# Patient Record
Sex: FEMALE | Race: BLACK OR AFRICAN AMERICAN | NOT HISPANIC OR LATINO | URBAN - METROPOLITAN AREA
[De-identification: names, ages, dates, MRNs, and addresses within clinical notes are randomized per-mention and may not be internally consistent; named-entity substitution may affect disease eponyms.]

---

## 2017-09-19 ENCOUNTER — INPATIENT (INPATIENT)
Facility: HOSPITAL | Age: 52
LOS: 2 days | Discharge: HOME | DRG: 390 | End: 2017-09-22
Attending: SURGERY | Admitting: SURGERY
Payer: COMMERCIAL

## 2017-09-19 VITALS
RESPIRATION RATE: 18 BRPM | OXYGEN SATURATION: 99 % | SYSTOLIC BLOOD PRESSURE: 152 MMHG | TEMPERATURE: 98 F | HEART RATE: 99 BPM | WEIGHT: 259.93 LBS | DIASTOLIC BLOOD PRESSURE: 82 MMHG

## 2017-09-19 DIAGNOSIS — Z90.711 ACQUIRED ABSENCE OF UTERUS WITH REMAINING CERVICAL STUMP: Chronic | ICD-10-CM

## 2017-09-19 DIAGNOSIS — Z90.49 ACQUIRED ABSENCE OF OTHER SPECIFIED PARTS OF DIGESTIVE TRACT: Chronic | ICD-10-CM

## 2017-09-19 LAB
ALBUMIN SERPL ELPH-MCNC: 4.8 G/DL — SIGNIFICANT CHANGE UP (ref 3.3–5)
ALP SERPL-CCNC: 105 U/L — SIGNIFICANT CHANGE UP (ref 40–120)
ALT FLD-CCNC: 15 U/L — SIGNIFICANT CHANGE UP (ref 10–45)
AMYLASE P1 CFR SERPL: 52 U/L — SIGNIFICANT CHANGE UP (ref 25–125)
ANION GAP SERPL CALC-SCNC: 14 MMOL/L — SIGNIFICANT CHANGE UP (ref 5–17)
APPEARANCE UR: CLEAR — SIGNIFICANT CHANGE UP
APTT BLD: 37.5 SEC — HIGH (ref 27.5–37.4)
AST SERPL-CCNC: 15 U/L — SIGNIFICANT CHANGE UP (ref 10–40)
BASOPHILS NFR BLD AUTO: 0.2 % — SIGNIFICANT CHANGE UP (ref 0–2)
BILIRUB SERPL-MCNC: 0.3 MG/DL — SIGNIFICANT CHANGE UP (ref 0.2–1.2)
BILIRUB UR-MCNC: NEGATIVE — SIGNIFICANT CHANGE UP
BUN SERPL-MCNC: 19 MG/DL — SIGNIFICANT CHANGE UP (ref 7–23)
CALCIUM SERPL-MCNC: 10.3 MG/DL — SIGNIFICANT CHANGE UP (ref 8.4–10.5)
CHLORIDE SERPL-SCNC: 96 MMOL/L — SIGNIFICANT CHANGE UP (ref 96–108)
CO2 SERPL-SCNC: 25 MMOL/L — SIGNIFICANT CHANGE UP (ref 22–31)
COLOR SPEC: YELLOW — SIGNIFICANT CHANGE UP
CREAT SERPL-MCNC: 1.31 MG/DL — HIGH (ref 0.5–1.3)
DIFF PNL FLD: NEGATIVE — SIGNIFICANT CHANGE UP
EOSINOPHIL NFR BLD AUTO: 0.2 % — SIGNIFICANT CHANGE UP (ref 0–6)
EXTRA SST TUBE: SIGNIFICANT CHANGE UP
GLUCOSE SERPL-MCNC: 198 MG/DL — HIGH (ref 70–99)
GLUCOSE UR QL: NEGATIVE — SIGNIFICANT CHANGE UP
HCT VFR BLD CALC: 47 % — HIGH (ref 34.5–45)
HGB BLD-MCNC: 15.4 G/DL — SIGNIFICANT CHANGE UP (ref 11.5–15.5)
INR BLD: 1.04 — SIGNIFICANT CHANGE UP (ref 0.88–1.16)
KETONES UR-MCNC: NEGATIVE — SIGNIFICANT CHANGE UP
LEUKOCYTE ESTERASE UR-ACNC: NEGATIVE — SIGNIFICANT CHANGE UP
LIDOCAIN IGE QN: 14 U/L — SIGNIFICANT CHANGE UP (ref 7–60)
LYMPHOCYTES # BLD AUTO: 13 % — SIGNIFICANT CHANGE UP (ref 13–44)
MCHC RBC-ENTMCNC: 29.2 PG — SIGNIFICANT CHANGE UP (ref 27–34)
MCHC RBC-ENTMCNC: 32.8 G/DL — SIGNIFICANT CHANGE UP (ref 32–36)
MCV RBC AUTO: 89.2 FL — SIGNIFICANT CHANGE UP (ref 80–100)
MONOCYTES NFR BLD AUTO: 2.4 % — SIGNIFICANT CHANGE UP (ref 2–14)
NEUTROPHILS NFR BLD AUTO: 84.2 % — HIGH (ref 43–77)
NITRITE UR-MCNC: NEGATIVE — SIGNIFICANT CHANGE UP
PH UR: 6 — SIGNIFICANT CHANGE UP (ref 5–8)
PLATELET # BLD AUTO: 316 K/UL — SIGNIFICANT CHANGE UP (ref 150–400)
POTASSIUM SERPL-MCNC: 4.6 MMOL/L — SIGNIFICANT CHANGE UP (ref 3.5–5.3)
POTASSIUM SERPL-SCNC: 4.6 MMOL/L — SIGNIFICANT CHANGE UP (ref 3.5–5.3)
PROT SERPL-MCNC: 9.2 G/DL — HIGH (ref 6–8.3)
PROT UR-MCNC: NEGATIVE MG/DL — SIGNIFICANT CHANGE UP
PROTHROM AB SERPL-ACNC: 11.6 SEC — SIGNIFICANT CHANGE UP (ref 9.8–12.7)
RBC # BLD: 5.27 M/UL — HIGH (ref 3.8–5.2)
RBC # FLD: 12.4 % — SIGNIFICANT CHANGE UP (ref 10.3–16.9)
SODIUM SERPL-SCNC: 135 MMOL/L — SIGNIFICANT CHANGE UP (ref 135–145)
SP GR SPEC: 1.01 — SIGNIFICANT CHANGE UP (ref 1–1.03)
UROBILINOGEN FLD QL: 0.2 E.U./DL — SIGNIFICANT CHANGE UP
WBC # BLD: 10.6 K/UL — HIGH (ref 3.8–10.5)
WBC # FLD AUTO: 10.6 K/UL — HIGH (ref 3.8–10.5)

## 2017-09-19 PROCEDURE — 99285 EMERGENCY DEPT VISIT HI MDM: CPT | Mod: 25

## 2017-09-19 PROCEDURE — 71010: CPT | Mod: 26

## 2017-09-19 PROCEDURE — 74177 CT ABD & PELVIS W/CONTRAST: CPT | Mod: 26

## 2017-09-19 RX ORDER — SODIUM CHLORIDE 9 MG/ML
1000 INJECTION, SOLUTION INTRAVENOUS
Qty: 0 | Refills: 0 | Status: DISCONTINUED | OUTPATIENT
Start: 2017-09-19 | End: 2017-09-22

## 2017-09-19 RX ORDER — METOCLOPRAMIDE HCL 10 MG
10 TABLET ORAL ONCE
Qty: 0 | Refills: 0 | Status: COMPLETED | OUTPATIENT
Start: 2017-09-19 | End: 2017-09-19

## 2017-09-19 RX ORDER — SODIUM CHLORIDE 9 MG/ML
1000 INJECTION INTRAMUSCULAR; INTRAVENOUS; SUBCUTANEOUS ONCE
Qty: 0 | Refills: 0 | Status: COMPLETED | OUTPATIENT
Start: 2017-09-19 | End: 2017-09-19

## 2017-09-19 RX ORDER — FAMOTIDINE 10 MG/ML
20 INJECTION INTRAVENOUS ONCE
Qty: 0 | Refills: 0 | Status: COMPLETED | OUTPATIENT
Start: 2017-09-19 | End: 2017-09-19

## 2017-09-19 RX ORDER — INFLUENZA VIRUS VACCINE 15; 15; 15; 15 UG/.5ML; UG/.5ML; UG/.5ML; UG/.5ML
0.5 SUSPENSION INTRAMUSCULAR ONCE
Qty: 0 | Refills: 0 | Status: COMPLETED | OUTPATIENT
Start: 2017-09-19 | End: 2017-09-19

## 2017-09-19 RX ORDER — ONDANSETRON 8 MG/1
4 TABLET, FILM COATED ORAL EVERY 6 HOURS
Qty: 0 | Refills: 0 | Status: DISCONTINUED | OUTPATIENT
Start: 2017-09-19 | End: 2017-09-22

## 2017-09-19 RX ORDER — ACETAMINOPHEN 500 MG
1000 TABLET ORAL ONCE
Qty: 0 | Refills: 0 | Status: COMPLETED | OUTPATIENT
Start: 2017-09-19 | End: 2017-09-19

## 2017-09-19 RX ORDER — DIPHENHYDRAMINE HCL 50 MG
25 CAPSULE ORAL ONCE
Qty: 0 | Refills: 0 | Status: COMPLETED | OUTPATIENT
Start: 2017-09-19 | End: 2017-09-19

## 2017-09-19 RX ORDER — ONDANSETRON 8 MG/1
4 TABLET, FILM COATED ORAL ONCE
Qty: 0 | Refills: 0 | Status: COMPLETED | OUTPATIENT
Start: 2017-09-19 | End: 2017-09-19

## 2017-09-19 RX ORDER — BENZOCAINE AND MENTHOL 5; 1 G/100ML; G/100ML
1 LIQUID ORAL THREE TIMES A DAY
Qty: 0 | Refills: 0 | Status: DISCONTINUED | OUTPATIENT
Start: 2017-09-19 | End: 2017-09-22

## 2017-09-19 RX ORDER — MORPHINE SULFATE 50 MG/1
4 CAPSULE, EXTENDED RELEASE ORAL ONCE
Qty: 0 | Refills: 0 | Status: DISCONTINUED | OUTPATIENT
Start: 2017-09-19 | End: 2017-09-19

## 2017-09-19 RX ORDER — IOHEXOL 300 MG/ML
50 INJECTION, SOLUTION INTRAVENOUS ONCE
Qty: 0 | Refills: 0 | Status: COMPLETED | OUTPATIENT
Start: 2017-09-19 | End: 2017-09-19

## 2017-09-19 RX ORDER — HEPARIN SODIUM 5000 [USP'U]/ML
7500 INJECTION INTRAVENOUS; SUBCUTANEOUS EVERY 8 HOURS
Qty: 0 | Refills: 0 | Status: DISCONTINUED | OUTPATIENT
Start: 2017-09-19 | End: 2017-09-22

## 2017-09-19 RX ADMIN — Medication 1000 MILLIGRAM(S): at 22:00

## 2017-09-19 RX ADMIN — SODIUM CHLORIDE 180 MILLILITER(S): 9 INJECTION, SOLUTION INTRAVENOUS at 09:22

## 2017-09-19 RX ADMIN — IOHEXOL 50 MILLILITER(S): 300 INJECTION, SOLUTION INTRAVENOUS at 03:08

## 2017-09-19 RX ADMIN — ONDANSETRON 4 MILLIGRAM(S): 8 TABLET, FILM COATED ORAL at 03:08

## 2017-09-19 RX ADMIN — Medication 10 MILLIGRAM(S): at 04:38

## 2017-09-19 RX ADMIN — HEPARIN SODIUM 7500 UNIT(S): 5000 INJECTION INTRAVENOUS; SUBCUTANEOUS at 13:27

## 2017-09-19 RX ADMIN — SODIUM CHLORIDE 1000 MILLILITER(S): 9 INJECTION INTRAMUSCULAR; INTRAVENOUS; SUBCUTANEOUS at 06:09

## 2017-09-19 RX ADMIN — MORPHINE SULFATE 4 MILLIGRAM(S): 50 CAPSULE, EXTENDED RELEASE ORAL at 06:08

## 2017-09-19 RX ADMIN — BENZOCAINE AND MENTHOL 1 LOZENGE: 5; 1 LIQUID ORAL at 21:09

## 2017-09-19 RX ADMIN — MORPHINE SULFATE 4 MILLIGRAM(S): 50 CAPSULE, EXTENDED RELEASE ORAL at 03:08

## 2017-09-19 RX ADMIN — Medication 400 MILLIGRAM(S): at 09:22

## 2017-09-19 RX ADMIN — HEPARIN SODIUM 7500 UNIT(S): 5000 INJECTION INTRAVENOUS; SUBCUTANEOUS at 21:09

## 2017-09-19 RX ADMIN — Medication 25 MILLIGRAM(S): at 22:11

## 2017-09-19 RX ADMIN — FAMOTIDINE 20 MILLIGRAM(S): 10 INJECTION INTRAVENOUS at 03:08

## 2017-09-19 RX ADMIN — Medication 400 MILLIGRAM(S): at 21:09

## 2017-09-19 RX ADMIN — Medication 1000 MILLIGRAM(S): at 09:37

## 2017-09-19 RX ADMIN — MORPHINE SULFATE 4 MILLIGRAM(S): 50 CAPSULE, EXTENDED RELEASE ORAL at 04:38

## 2017-09-19 RX ADMIN — SODIUM CHLORIDE 180 MILLILITER(S): 9 INJECTION, SOLUTION INTRAVENOUS at 22:40

## 2017-09-19 RX ADMIN — BENZOCAINE AND MENTHOL 1 LOZENGE: 5; 1 LIQUID ORAL at 13:26

## 2017-09-19 RX ADMIN — SODIUM CHLORIDE 2000 MILLILITER(S): 9 INJECTION INTRAMUSCULAR; INTRAVENOUS; SUBCUTANEOUS at 03:09

## 2017-09-19 NOTE — ED PROVIDER NOTE - PHYSICAL EXAMINATION
CONSTITUTIONAL: Well-appearing; well-nourished; in no apparent distress.   HEAD: Normocephalic; atraumatic.   EYES: PERRL; EOM intact; conjunctiva and sclera clear  ENT: normal nose; no rhinorrhea; normal pharynx with no erythema or lesions.   NECK: Supple; non-tender; no LAD  CARDIOVASCULAR: Normal S1, S2; no murmurs, rubs, or gallops. Regular rate and rhythm.   RESPIRATORY: Breathing easily; breath sounds clear and equal bilaterally; no wheezes, rhonchi, or rales.  GI: Soft; non-distended; +tenderness to RLQ   MSK: FROM at all extremities, normal tone   EXT: No cyanosis or edema; N/V intact  SKIN: Normal for age and race; warm; dry; good turgor; no apparent lesions or rash.   NEURO: A & O x 3; face symmetric; grossly unremarkable.   PSYCHOLOGICAL: The patient’s mood and manner are appropriate.

## 2017-09-19 NOTE — H&P ADULT - ASSESSMENT
Ms. Amelia Cross is a 52 yo female with morbid obesity, htn, and known diverticulosis presenting with a day hx of diffuse abdominal pain, nausea, and multiple episodes of nbnb vomiting.     HD stable, afebrile, mild leukocytosis, and CT imaging significant for  high grade small bowel obstruction in the small bowel in the ileum. Patient appears comfortable and had flatus as recently as last night.     - Admit to General Surgery under Dr. Turcios  - NPO/IVF  - No NG tube at the moment  - Serial abdominal exams   - DVT ppx

## 2017-09-19 NOTE — H&P ADULT - HISTORY OF PRESENT ILLNESS
Mrs. Amelia Dozier is a 50 yo female with a hx significant for hypertension, morbid obesity, and known diverticulitis (diagnosed 3 years ago). Surgical hx significant for partial hysterectomy (c/b by SBO and questionable volvulus (per patient report) for which she went to [facility] and received NG decompression), questionable history of cholecystotomy (in ED note, but patient seemed confused when asked about it).     She presents to [facility] ED with a 1 day hx of diffuse nausea and abdominal pain, associated with PO intolerance, 7 episodes of NBNB vomiting, and chills. Reports diffuse pain in lower abdomen, mostly on the right side, as well as focal pain and discomfort in the epigastric region. Last bowel movement was in 24 hours ago, yesterday morning, and described as loose and watery. Reports no flatus since last night.     Reports last colonoscopy was in  which showed diverticulosis. Also had an endoscopy in 2016 which was negative.     Allergies: Percocet (pruritis)    Meds: Losartan for Hypertension        Vital Signs Last 24 Hrs  T(F): 97.7 (19 Sep 2017 07:49), Max: 98.7 (19 Sep 2017 06:57)  HR: 89 (19 Sep 2017 07:49) (79 - 99)  BP: 140/91 (19 Sep 2017 07:49) (128/89 - 152/82)  BP(mean): --  RR: 18 (19 Sep 2017 07:49) (18 - 19)  SpO2: 99% (19 Sep 2017 07:49) (99% - 99%)      General: NAD, alert, interactive, appears comfortable  Pulm: Nonlabored breathing, no respiratory distress  Abd: morbidly obese, softly distended, very mild discomfort to palpation in RLQ and epigastrium, no rebound or guarding         LABS:                        15.4   10.6  )-----------( 316      ( 19 Sep 2017 02:49 )             47.0     -    135  |  96  |  19  ----------------------------<  198<H>  4.6   |  25  |  1.31<H>    Ca    10.3      19 Sep 2017 02:49    TPro  9.2<H>  /  Alb  4.8  /  TBili  0.3  /  DBili  x   /  AST  15  /  ALT  15  /  AlkPhos  105      PT/INR - ( 19 Sep 2017 02:49 )   PT: 11.6 sec;   INR: 1.04          PTT - ( 19 Sep 2017 02:49 )  PTT:37.5 sec  Urinalysis Basic - ( 19 Sep 2017 07:20 )    Color: Yellow / Appearance: Clear / S.010 / pH: x  Gluc: x / Ketone: NEGATIVE  / Bili: NEGATIVE / Urobili: 0.2 E.U./dL   Blood: x / Protein: NEGATIVE mg/dL / Nitrite: NEGATIVE   Leuk Esterase: NEGATIVE / RBC: x / WBC x   Sq Epi: x / Non Sq Epi: x / Bacteria: x        RADIOLOGY & ADDITIONAL STUDIES:    < from: CT Abdomen and Pelvis w/ Oral Cont and w/ IV Cont (17 @ 05:12) >  Stomach and bowel: Fluid distended and several dilated loops of small   bowel measuring up to 4 cm  in diameter with transition to decompressed ileum and colon in the mid   anterior pelvis.. Semisolid  stool like material in distal dilated small bowel suggests a chronic   component to the high-grade  obstruction.  Appendix: Appendix well-visualized. No evidence of appendicitis.    IMPRESSION:  High-grade small bowel obstruction.  Stable post cholecystectomy biliary dilatation.  Thank you for allowing us to participate in the care of your patient.  Dictated and Authenticated by: Wilian Hughes MD  2017 5:34 AM Eastern Time ([place] & [place])    The above report was submitted by the [facility] attending radiologist and   copied to [facility] by resident Dr. Neely.    Resident addendum: Status post at least supracervical hysterectomy.    < end of copied text >

## 2017-09-19 NOTE — H&P ADULT - ATTENDING COMMENTS
Seen and examined this AM  Mild TTP diffusely without guarding.  CT consistent with SBO    A/P: Adhesive small bowel obstruction  1. NGT, IVF  2. OOB, IS  3. Monitor abdominal symptoms, exam.

## 2017-09-19 NOTE — ED PROVIDER NOTE - OBJECTIVE STATEMENT
52 yo F with pmh of diverticulitis, gallstones and HTN c/o generalized abd pain since 4pm. Pain more severe around umbilicus. Pain is constant associated with "contractions." Associated with chills. Reports 7 episodes of non-bloody, non-bilious vomiting. Denies fever, diarrhea, dysuria, hematuria, cp, sob. Pt states she was given a script of cipro just in case she has symptoms which she started taking today. Pt states pain is worse than her previous diverticulitis attack. Denies sick contacts, recent travel or bad food exposure. Last BM was yesterday. Pts GI doctor is Dr. Kay 50 yo F with pmh of diverticulitis, gallstones s/o cholecystectomy, s/p partial hysterectomy and HTN c/o generalized abd pain since 4pm. Pain more severe around umbilicus. Pain is constant associated with "contractions." Associated with chills. Reports 7 episodes of non-bloody, non-bilious vomiting. Denies fever, diarrhea, dysuria, hematuria, cp, sob. Pt states she was given a script of cipro just in case she has symptoms which she started taking today. Pt states pain is worse than her previous diverticulitis attack. Denies sick contacts, recent travel or bad food exposure. Last BM was yesterday. Pts GI doctor is Dr. Kay

## 2017-09-19 NOTE — PROVIDER CONTACT NOTE (OTHER) - ASSESSMENT
Pt arrived to [address] [place] [address]. There are no orders. Pt also requested for pain and antinausea medication.

## 2017-09-19 NOTE — ED PROVIDER NOTE - ATTENDING CONTRIBUTION TO CARE
50 yo F with hx of of HTN, diverticulitis, s/p cholecystectomy and partial hysterectomy presents with several ours of generalized abd pain with chills and several episodes of nbnb emesis. Denies fevers,  dysuria, hematuria, cp, sob. Pt AAO, NAD, Abd: soft/ (+) diffuse abd TTP, no rebound. Labs/ studies noted. Pt with SBO. Surgery consulted and pt admitted to surgery. Stable in ED.

## 2017-09-19 NOTE — ED PROVIDER NOTE - MEDICAL DECISION MAKING DETAILS
52 yo f with pmh of htn, gallstones, diverticulitis c/o generalized abd pain since 4pm today with n/v and chills. +ttp to RLQ, r/o appendicitis r/o diverticulitis

## 2017-09-19 NOTE — ED PROVIDER NOTE - NS ED MD DISPO ADMIT [facility]
[address] 
[address] [facility] [id number] 
[phone] Work/School Note Date: 5/9/2017 To Whom It May concern: 
 
Jj Null was seen and treated today in the emergency room by the following provider(s): 
Physician Assistant: Neisha Benedict PA-C. Jj Null may return to work on Friday, 5/12/2017 after being cleared by an orthopedist. 
 
Sincerely, Jad Olmos RN 
 
 
 

SURGERY

## 2017-09-19 NOTE — ED ADULT NURSE NOTE - OBJECTIVE STATEMENT
pt. with PMH of diverticulitis presented with c/o lower abdominal pain and lower back pain and vomiting since 4pm yesterday. pt. is A&Ox3, breathing unlabored, skin warm, dry, pt. rates pain 8/10. pt. denies any chest pain, shortness of breath, diarrhea, constipation, urinary symptoms, fever, chills.

## 2017-09-20 LAB
ANION GAP SERPL CALC-SCNC: 12 MMOL/L — SIGNIFICANT CHANGE UP (ref 5–17)
BUN SERPL-MCNC: 10 MG/DL — SIGNIFICANT CHANGE UP (ref 7–23)
CALCIUM SERPL-MCNC: 8.3 MG/DL — LOW (ref 8.4–10.5)
CHLORIDE SERPL-SCNC: 99 MMOL/L — SIGNIFICANT CHANGE UP (ref 96–108)
CO2 SERPL-SCNC: 25 MMOL/L — SIGNIFICANT CHANGE UP (ref 22–31)
CREAT SERPL-MCNC: 1.18 MG/DL — SIGNIFICANT CHANGE UP (ref 0.5–1.3)
GLUCOSE SERPL-MCNC: 125 MG/DL — HIGH (ref 70–99)
HCT VFR BLD CALC: 40 % — SIGNIFICANT CHANGE UP (ref 34.5–45)
HGB BLD-MCNC: 13 G/DL — SIGNIFICANT CHANGE UP (ref 11.5–15.5)
MAGNESIUM SERPL-MCNC: 1.9 MG/DL — SIGNIFICANT CHANGE UP (ref 1.6–2.6)
MCHC RBC-ENTMCNC: 29.6 PG — SIGNIFICANT CHANGE UP (ref 27–34)
MCHC RBC-ENTMCNC: 32.5 G/DL — SIGNIFICANT CHANGE UP (ref 32–36)
MCV RBC AUTO: 91.1 FL — SIGNIFICANT CHANGE UP (ref 80–100)
PHOSPHATE SERPL-MCNC: 2.2 MG/DL — LOW (ref 2.5–4.5)
PLATELET # BLD AUTO: 266 K/UL — SIGNIFICANT CHANGE UP (ref 150–400)
POTASSIUM SERPL-MCNC: 3.6 MMOL/L — SIGNIFICANT CHANGE UP (ref 3.5–5.3)
POTASSIUM SERPL-SCNC: 3.6 MMOL/L — SIGNIFICANT CHANGE UP (ref 3.5–5.3)
RBC # BLD: 4.39 M/UL — SIGNIFICANT CHANGE UP (ref 3.8–5.2)
RBC # FLD: 12.4 % — SIGNIFICANT CHANGE UP (ref 10.3–16.9)
SODIUM SERPL-SCNC: 136 MMOL/L — SIGNIFICANT CHANGE UP (ref 135–145)
WBC # BLD: 8.6 K/UL — SIGNIFICANT CHANGE UP (ref 3.8–10.5)
WBC # FLD AUTO: 8.6 K/UL — SIGNIFICANT CHANGE UP (ref 3.8–10.5)

## 2017-09-20 RX ORDER — ACETAMINOPHEN 500 MG
1000 TABLET ORAL ONCE
Qty: 0 | Refills: 0 | Status: COMPLETED | OUTPATIENT
Start: 2017-09-20 | End: 2017-09-20

## 2017-09-20 RX ORDER — DIPHENHYDRAMINE HCL 50 MG
50 CAPSULE ORAL AT BEDTIME
Qty: 0 | Refills: 0 | Status: DISCONTINUED | OUTPATIENT
Start: 2017-09-20 | End: 2017-09-22

## 2017-09-20 RX ORDER — MORPHINE SULFATE 50 MG/1
2 CAPSULE, EXTENDED RELEASE ORAL EVERY 4 HOURS
Qty: 0 | Refills: 0 | Status: DISCONTINUED | OUTPATIENT
Start: 2017-09-20 | End: 2017-09-22

## 2017-09-20 RX ORDER — ACETAMINOPHEN 500 MG
1000 TABLET ORAL EVERY 6 HOURS
Qty: 0 | Refills: 0 | Status: DISCONTINUED | OUTPATIENT
Start: 2017-09-20 | End: 2017-09-22

## 2017-09-20 RX ADMIN — Medication 1000 MILLIGRAM(S): at 10:40

## 2017-09-20 RX ADMIN — Medication 50 MILLIGRAM(S): at 22:10

## 2017-09-20 RX ADMIN — SODIUM CHLORIDE 180 MILLILITER(S): 9 INJECTION, SOLUTION INTRAVENOUS at 05:24

## 2017-09-20 RX ADMIN — MORPHINE SULFATE 2 MILLIGRAM(S): 50 CAPSULE, EXTENDED RELEASE ORAL at 22:30

## 2017-09-20 RX ADMIN — BENZOCAINE AND MENTHOL 1 LOZENGE: 5; 1 LIQUID ORAL at 14:24

## 2017-09-20 RX ADMIN — HEPARIN SODIUM 7500 UNIT(S): 5000 INJECTION INTRAVENOUS; SUBCUTANEOUS at 22:10

## 2017-09-20 RX ADMIN — HEPARIN SODIUM 7500 UNIT(S): 5000 INJECTION INTRAVENOUS; SUBCUTANEOUS at 05:24

## 2017-09-20 RX ADMIN — Medication 400 MILLIGRAM(S): at 10:05

## 2017-09-20 RX ADMIN — MORPHINE SULFATE 2 MILLIGRAM(S): 50 CAPSULE, EXTENDED RELEASE ORAL at 22:10

## 2017-09-20 RX ADMIN — BENZOCAINE AND MENTHOL 1 LOZENGE: 5; 1 LIQUID ORAL at 05:24

## 2017-09-20 RX ADMIN — HEPARIN SODIUM 7500 UNIT(S): 5000 INJECTION INTRAVENOUS; SUBCUTANEOUS at 14:24

## 2017-09-20 NOTE — PROGRESS NOTE ADULT - SUBJECTIVE AND OBJECTIVE BOX
o/n : NGT output :  serial abd exam : soft, non tender, patient claimed the distension was decreasing. +flatus.  9/19: Admitted for SBO; WBC: 10.6; HD; stable; distended; ng tube placed; o/n : NGT output :400cc  serial abd exam : soft, non tender, patient claimed the distension was decreasing. +flatus.  9/19: Admitted for SBO; WBC: 10.6; HD; stable; distended; ng tube placed; o/n : NGT output :400cc  serial abd exam : soft, non tender, patient claimed the distension was decreasing. +flatus.  : Admitted for SBO; WBC: 10.6; HD; stable; distended; ng tube placed;     SUBJECTIVE: Patient seen and examined bedside by surgery team. +flatus. abd pain decreasing. NGT out this am.     heparin  Injectable 7500 Unit(s) SubCutaneous every 8 hours      Vital Signs Last 24 Hrs  T(C): 37.1 (20 Sep 2017 05:23), Max: 37.3 (19 Sep 2017 16:38)  T(F): 98.7 (20 Sep 2017 05:23), Max: 99.2 (19 Sep 2017 16:38)  HR: 102 (20 Sep 2017 05:23) (76 - 102)  BP: 137/89 (20 Sep 2017 05:23) (133/88 - 147/91)  BP(mean): --  RR: 17 (20 Sep 2017 05:23) (16 - 17)  SpO2: 97% (20 Sep 2017 05:23) (97% - 98%)  I&O's Detail    19 Sep 2017 07:01  -  20 Sep 2017 07:00  --------------------------------------------------------  IN:    IV PiggyBack: 100 mL    sodium chloride 0.45%.: 1890 mL  Total IN: 1990 mL    OUT:    Nasoenteral Tube: 900 mL    Voided: 1100 mL  Total OUT: 2000 mL    Total NET: -10 mL          General: NAD, resting comfortably in bed  C/V: NSR  Pulm: Nonlabored breathing, no respiratory distress  Abd: softly distended. mild discomfort in RLQ, improving exam. No r/g  Extrem: WWP, no edema, SCDs in place        LABS:                        13.0   8.6   )-----------( 266      ( 20 Sep 2017 06:37 )             40.0     09-20    136  |  99  |  10  ----------------------------<  125<H>  3.6   |  25  |  1.18    Ca    8.3<L>      20 Sep 2017 06:39  Phos  2.2     -  Mg     1.9     -    TPro  9.2<H>  /  Alb  4.8  /  TBili  0.3  /  DBili  x   /  AST  15  /  ALT  15  /  AlkPhos  105  -    PT/INR - ( 19 Sep 2017 02:49 )   PT: 11.6 sec;   INR: 1.04          PTT - ( 19 Sep 2017 02:49 )  PTT:37.5 sec  Urinalysis Basic - ( 19 Sep 2017 07:20 )    Color: Yellow / Appearance: Clear / S.010 / pH: x  Gluc: x / Ketone: NEGATIVE  / Bili: NEGATIVE / Urobili: 0.2 E.U./dL   Blood: x / Protein: NEGATIVE mg/dL / Nitrite: NEGATIVE   Leuk Esterase: NEGATIVE / RBC: x / WBC x   Sq Epi: x / Non Sq Epi: x / Bacteria: x        RADIOLOGY & ADDITIONAL STUDIES:

## 2017-09-20 NOTE — PROGRESS NOTE ADULT - ASSESSMENT
50 yo female with morbid obesity, htn, and known diverticulosis presenting with SBO - improving.    Monitor I/O  GI fx : +flatus.   serial abdominal exams Q6  DVT prophylaxis  OOTB/ISS  Pain control - no narcotics  NPO/IVF  DC NGT?  Advance to CLD? 50 yo female with morbid obesity, htn, and known diverticulosis presenting with SBO - improving.    Monitor I/O  GI fx : +flatus.   serial abdominal exams Q6  DVT prophylaxis  OOTB/ISS  Pain control - no narcotics  NPO/IVF  DC NGT  c/w sips/chips this am

## 2017-09-21 LAB
ANION GAP SERPL CALC-SCNC: 8 MMOL/L — SIGNIFICANT CHANGE UP (ref 5–17)
BUN SERPL-MCNC: 7 MG/DL — SIGNIFICANT CHANGE UP (ref 7–23)
CALCIUM SERPL-MCNC: 8.1 MG/DL — LOW (ref 8.4–10.5)
CHLORIDE SERPL-SCNC: 107 MMOL/L — SIGNIFICANT CHANGE UP (ref 96–108)
CO2 SERPL-SCNC: 25 MMOL/L — SIGNIFICANT CHANGE UP (ref 22–31)
CREAT SERPL-MCNC: 1.1 MG/DL — SIGNIFICANT CHANGE UP (ref 0.5–1.3)
GLUCOSE SERPL-MCNC: 106 MG/DL — HIGH (ref 70–99)
MAGNESIUM SERPL-MCNC: 2.1 MG/DL — SIGNIFICANT CHANGE UP (ref 1.6–2.6)
PHOSPHATE SERPL-MCNC: 2.2 MG/DL — LOW (ref 2.5–4.5)
POTASSIUM SERPL-MCNC: 3.8 MMOL/L — SIGNIFICANT CHANGE UP (ref 3.5–5.3)
POTASSIUM SERPL-SCNC: 3.8 MMOL/L — SIGNIFICANT CHANGE UP (ref 3.5–5.3)
SODIUM SERPL-SCNC: 140 MMOL/L — SIGNIFICANT CHANGE UP (ref 135–145)

## 2017-09-21 RX ORDER — POTASSIUM PHOSPHATE, MONOBASIC POTASSIUM PHOSPHATE, DIBASIC 236; 224 MG/ML; MG/ML
15 INJECTION, SOLUTION INTRAVENOUS ONCE
Qty: 0 | Refills: 0 | Status: COMPLETED | OUTPATIENT
Start: 2017-09-21 | End: 2017-09-21

## 2017-09-21 RX ADMIN — POTASSIUM PHOSPHATE, MONOBASIC POTASSIUM PHOSPHATE, DIBASIC 62.5 MILLIMOLE(S): 236; 224 INJECTION, SOLUTION INTRAVENOUS at 22:44

## 2017-09-21 RX ADMIN — SODIUM CHLORIDE 180 MILLILITER(S): 9 INJECTION, SOLUTION INTRAVENOUS at 07:24

## 2017-09-21 RX ADMIN — HEPARIN SODIUM 7500 UNIT(S): 5000 INJECTION INTRAVENOUS; SUBCUTANEOUS at 13:48

## 2017-09-21 RX ADMIN — HEPARIN SODIUM 7500 UNIT(S): 5000 INJECTION INTRAVENOUS; SUBCUTANEOUS at 07:24

## 2017-09-21 RX ADMIN — Medication 50 MILLIGRAM(S): at 22:48

## 2017-09-21 RX ADMIN — HEPARIN SODIUM 7500 UNIT(S): 5000 INJECTION INTRAVENOUS; SUBCUTANEOUS at 22:47

## 2017-09-21 NOTE — PROGRESS NOTE ADULT - ASSESSMENT
52 yo female with morbid obesity, htn, and known diverticulosis presenting with SBO, resolving +GI fx, however unable to tolerate CLD, on NPO with sips and chips     - Advance to CLD  - Serial abd exam  - pain and nausea control  - DVT prophylaxis : SQH and SCD 50 yo female with morbid obesity, htn, and known diverticulosis presenting with SBO, resolving +GI fx, however unable to tolerate CLD, on NPO with sips and chips     - Advance to CLD, currently NPO with IVF  - Serial abd exam  - pain and nausea control  - DVT prophylaxis : SQH and SCD  - OOTB/ISS 50 yo female with morbid obesity, htn, and known diverticulosis presenting with SBO, resolving +GI fx    - Advance to Clears in AM, advance as tolerated  - Possible discharge later this evening if she does well    - Serial abd exam  - pain and nausea control  - DVT prophylaxis : SQH and SCD  - OOTB/ISS

## 2017-09-21 NOTE — PROGRESS NOTE ADULT - ATTENDING COMMENTS
Passed flatus, feels better. However, had one episode of cramping this AM since NGT removed.  AFVSS  Abd soft, mild dist, minimal TTP diffusely    A/P: Resolving small bowel obstruction due to adhesions.  1. sips of clears today  2. OOB, IS  3. Gentle IVF hydration.
Had difficulty with liquids yesterday morning, now feels better.  Passing flatus  AFVSS  Abd soft, min distention, slight tenderness in RUQ    Resolving SBO  1. clears -> low residue diet  2. OOB, IS  3. If doing well can go home.  Otherwise AXR and possible operation.

## 2017-09-21 NOTE — PROGRESS NOTE ADULT - SUBJECTIVE AND OBJECTIVE BOX
O/N : Had BM today, still on sips and chips.  9/20: advanced to clears in AM; not tolerating; increased pain in epigastrium and distended; +flatus; no BM O/N : Had BM today, still on sips and chips.  9/20: advanced to clears in AM; not tolerating; increased pain in epigastrium and distended; +flatus; no BM    SUBJECTIVE: Patient seen and examined bedside by surgery team. Patient did well overnight. Reports flatus, but no bowel movement. No nausea, but still feels a bit distended. No other complaints. Patient ready to go home.      heparin  Injectable 7500 Unit(s) SubCutaneous every 8 hours      Vital Signs Last 24 Hrs  T(C): 36.5 (21 Sep 2017 05:46), Max: 37.2 (20 Sep 2017 17:40)  T(F): 97.7 (21 Sep 2017 05:46), Max: 99 (20 Sep 2017 17:40)  HR: 90 (21 Sep 2017 05:46) (75 - 98)  BP: 96/62 (21 Sep 2017 05:46) (96/62 - 135/80)  BP(mean): --  RR: 16 (21 Sep 2017 05:46) (16 - 18)  SpO2: 93% (21 Sep 2017 05:46) (93% - 99%)  I&O's Detail    20 Sep 2017 07:01  -  21 Sep 2017 07:00  --------------------------------------------------------  IN:    sodium chloride 0.45%.: 1260 mL  Total IN: 1260 mL    OUT:    Voided: 700 mL  Total OUT: 700 mL    Total NET: 560 mL          General: NAD, alert, interactive, appears comfortable  C/V: NSR  Pulm: Nonlabored breathing, no respiratory distress  Abd: softly distended, mild tenderness to palpation   Extrem: WWP, no edema, SCDs in place        LABS:                        13.0   8.6   )-----------( 266      ( 20 Sep 2017 06:37 )             40.0     09-20    136  |  99  |  10  ----------------------------<  125<H>  3.6   |  25  |  1.18    Ca    8.3<L>      20 Sep 2017 06:39  Phos  2.2     09-20  Mg     1.9     09-20            RADIOLOGY & ADDITIONAL STUDIES: SUBJECTIVE: Patient seen and examined bedside by surgery team. Patient did well overnight. Improvement in nausea and discomfort since yesterday. Reports flatus, but no bowel movement. Reports no nausea, but still feels a bit distended. No other complaints. Patient ready to go home.      heparin  Injectable 7500 Unit(s) SubCutaneous every 8 hours      Vital Signs Last 24 Hrs  T(C): 36.5 (21 Sep 2017 05:46), Max: 37.2 (20 Sep 2017 17:40)  T(F): 97.7 (21 Sep 2017 05:46), Max: 99 (20 Sep 2017 17:40)  HR: 90 (21 Sep 2017 05:46) (75 - 98)  BP: 96/62 (21 Sep 2017 05:46) (96/62 - 135/80)  BP(mean): --  RR: 16 (21 Sep 2017 05:46) (16 - 18)  SpO2: 93% (21 Sep 2017 05:46) (93% - 99%)  I&O's Detail    20 Sep 2017 07:01  -  21 Sep 2017 07:00  --------------------------------------------------------  IN:    sodium chloride 0.45%.: 1260 mL  Total IN: 1260 mL    OUT:    Voided: 700 mL  Total OUT: 700 mL    Total NET: 560 mL          General: NAD, alert, interactive, appears comfortable  C/V: NSR  Pulm: Nonlabored breathing, no respiratory distress  Abd: softly distended, mild tenderness to palpation   Extrem: WWP, no edema, SCDs in place        LABS:                        13.0   8.6   )-----------( 266      ( 20 Sep 2017 06:37 )             40.0     09-20    136  |  99  |  10  ----------------------------<  125<H>  3.6   |  25  |  1.18    Ca    8.3<L>      20 Sep 2017 06:39  Phos  2.2     09-20  Mg     1.9     09-20            RADIOLOGY & ADDITIONAL STUDIES:

## 2017-09-22 ENCOUNTER — TRANSCRIPTION ENCOUNTER (OUTPATIENT)
Age: 52
End: 2017-09-22

## 2017-09-22 VITALS
TEMPERATURE: 99 F | DIASTOLIC BLOOD PRESSURE: 81 MMHG | OXYGEN SATURATION: 94 % | HEART RATE: 94 BPM | RESPIRATION RATE: 15 BRPM | SYSTOLIC BLOOD PRESSURE: 128 MMHG

## 2017-09-22 LAB
ANION GAP SERPL CALC-SCNC: 10 MMOL/L — SIGNIFICANT CHANGE UP (ref 5–17)
BUN SERPL-MCNC: 10 MG/DL — SIGNIFICANT CHANGE UP (ref 7–23)
CALCIUM SERPL-MCNC: 8 MG/DL — LOW (ref 8.4–10.5)
CHLORIDE SERPL-SCNC: 104 MMOL/L — SIGNIFICANT CHANGE UP (ref 96–108)
CO2 SERPL-SCNC: 25 MMOL/L — SIGNIFICANT CHANGE UP (ref 22–31)
CREAT SERPL-MCNC: 1.22 MG/DL — SIGNIFICANT CHANGE UP (ref 0.5–1.3)
GLUCOSE SERPL-MCNC: 115 MG/DL — HIGH (ref 70–99)
HCT VFR BLD CALC: 35.8 % — SIGNIFICANT CHANGE UP (ref 34.5–45)
HGB BLD-MCNC: 11.8 G/DL — SIGNIFICANT CHANGE UP (ref 11.5–15.5)
MAGNESIUM SERPL-MCNC: 2 MG/DL — SIGNIFICANT CHANGE UP (ref 1.6–2.6)
MCHC RBC-ENTMCNC: 30.2 PG — SIGNIFICANT CHANGE UP (ref 27–34)
MCHC RBC-ENTMCNC: 33 G/DL — SIGNIFICANT CHANGE UP (ref 32–36)
MCV RBC AUTO: 91.6 FL — SIGNIFICANT CHANGE UP (ref 80–100)
PHOSPHATE SERPL-MCNC: 3 MG/DL — SIGNIFICANT CHANGE UP (ref 2.5–4.5)
PLATELET # BLD AUTO: 253 K/UL — SIGNIFICANT CHANGE UP (ref 150–400)
POTASSIUM SERPL-MCNC: 4.2 MMOL/L — SIGNIFICANT CHANGE UP (ref 3.5–5.3)
POTASSIUM SERPL-SCNC: 4.2 MMOL/L — SIGNIFICANT CHANGE UP (ref 3.5–5.3)
RBC # BLD: 3.91 M/UL — SIGNIFICANT CHANGE UP (ref 3.8–5.2)
RBC # FLD: 12.5 % — SIGNIFICANT CHANGE UP (ref 10.3–16.9)
SODIUM SERPL-SCNC: 139 MMOL/L — SIGNIFICANT CHANGE UP (ref 135–145)
WBC # BLD: 8.2 K/UL — SIGNIFICANT CHANGE UP (ref 3.8–10.5)
WBC # FLD AUTO: 8.2 K/UL — SIGNIFICANT CHANGE UP (ref 3.8–10.5)

## 2017-09-22 PROCEDURE — 80053 COMPREHEN METABOLIC PANEL: CPT

## 2017-09-22 PROCEDURE — 96374 THER/PROPH/DIAG INJ IV PUSH: CPT | Mod: XU

## 2017-09-22 PROCEDURE — 99285 EMERGENCY DEPT VISIT HI MDM: CPT | Mod: 25

## 2017-09-22 PROCEDURE — 81003 URINALYSIS AUTO W/O SCOPE: CPT

## 2017-09-22 PROCEDURE — 74177 CT ABD & PELVIS W/CONTRAST: CPT

## 2017-09-22 PROCEDURE — 85610 PROTHROMBIN TIME: CPT

## 2017-09-22 PROCEDURE — 83735 ASSAY OF MAGNESIUM: CPT

## 2017-09-22 PROCEDURE — 96375 TX/PRO/DX INJ NEW DRUG ADDON: CPT | Mod: XU

## 2017-09-22 PROCEDURE — 85730 THROMBOPLASTIN TIME PARTIAL: CPT

## 2017-09-22 PROCEDURE — 83690 ASSAY OF LIPASE: CPT

## 2017-09-22 PROCEDURE — 84100 ASSAY OF PHOSPHORUS: CPT

## 2017-09-22 PROCEDURE — 85027 COMPLETE CBC AUTOMATED: CPT

## 2017-09-22 PROCEDURE — 96376 TX/PRO/DX INJ SAME DRUG ADON: CPT

## 2017-09-22 PROCEDURE — 82150 ASSAY OF AMYLASE: CPT

## 2017-09-22 PROCEDURE — 85025 COMPLETE CBC W/AUTO DIFF WBC: CPT

## 2017-09-22 PROCEDURE — 80048 BASIC METABOLIC PNL TOTAL CA: CPT

## 2017-09-22 PROCEDURE — 71045 X-RAY EXAM CHEST 1 VIEW: CPT

## 2017-09-22 PROCEDURE — 36415 COLL VENOUS BLD VENIPUNCTURE: CPT

## 2017-09-22 RX ADMIN — HEPARIN SODIUM 7500 UNIT(S): 5000 INJECTION INTRAVENOUS; SUBCUTANEOUS at 05:23

## 2017-09-22 NOTE — DISCHARGE NOTE ADULT - CARE PROVIDER_API CALL
Hermelinda Turcios), ColonRectal Surgery; Surgery  [address] [place] [address]  [address] [place] [address]  Phone: [phone]  Fax: [phone]

## 2017-09-22 NOTE — DISCHARGE NOTE ADULT - PLAN OF CARE
Recovery; Return to Activities of Daily living Follow-up with Dr. Turcios in 1-2 weeks in office at [phone].   Activity: as tolerated  Diet: Low fiber

## 2017-09-22 NOTE — DISCHARGE NOTE ADULT - MEDICATION SUMMARY - MEDICATIONS TO TAKE
I will START or STAY ON the medications listed below when I get home from the hospital:    losartan-hydrochlorothiazide 100mg-25mg oral tablet  -- 1 tab(s) by mouth once a day  -- Indication: For home med    Flexeril 5 mg oral tablet  -- 1 tab(s) by mouth daily  PRN for back pain  -- Indication: For home med

## 2017-09-22 NOTE — DISCHARGE NOTE ADULT - HOSPITAL COURSE
51F with morbid obesity, htn, and known diverticulosis presenting with a day hx of diffuse abdominal pain, nausea, and multiple episodes of NBNB vomiting. On presentation, patient was HD stable, afebrile, mild leukocytosis, and CT imaging significant for  high grade small bowel obstruction in the small bowel in the ileum. On 9/19, patient was given an NGT for decompression. Patient's serial abdominal exam improved and NGT yielded minimal output. On 9/20, she was advanced to clears and passed gas; subsequently NGT was dc'ed. On 9/21, her bowel function normalized with a bowel movement, she was advanced to low residue diet and tolerated it well. On 9/22, she was discharged stable with plans to follow up with Dr. Turcios in 1-2 weeks.

## 2017-09-22 NOTE — PROGRESS NOTE ADULT - ASSESSMENT
52 yo female with morbid obesity, htn, and known diverticulosis presenting with SBO, resolving +GI fx, on LRD. tolerating well.    - Low fiber diet  - Serial abd exam  - pain and nausea control  - am labs  - DVT prophylaxis : SQH and SCD  - dc in AM

## 2017-09-22 NOTE — DISCHARGE NOTE ADULT - PATIENT PORTAL LINK FT
“You can access the FollowHealth Patient Portal, offered by [facility], by registering with the following website: [email or web address]”

## 2017-09-22 NOTE — DISCHARGE NOTE ADULT - CARE PLAN
Principal Discharge DX:	SBO (small bowel obstruction)  Goal:	Recovery; Return to Activities of Daily living  Instructions for follow-up, activity and diet:	Follow-up with Dr. Turcios in 1-2 weeks in office at [phone].   Activity: as tolerated  Diet: Low fiber

## 2017-09-26 DIAGNOSIS — E66.01 MORBID (SEVERE) OBESITY DUE TO EXCESS CALORIES: ICD-10-CM

## 2017-09-26 DIAGNOSIS — K56.60 UNSPECIFIED INTESTINAL OBSTRUCTION: ICD-10-CM

## 2017-09-26 DIAGNOSIS — Z88.6 ALLERGY STATUS TO ANALGESIC AGENT: ICD-10-CM

## 2017-09-26 DIAGNOSIS — K57.92 DIVERTICULITIS OF INTESTINE, PART UNSPECIFIED, WITHOUT PERFORATION OR ABSCESS WITHOUT BLEEDING: ICD-10-CM

## 2017-09-26 DIAGNOSIS — Z90.710 ACQUIRED ABSENCE OF BOTH CERVIX AND UTERUS: ICD-10-CM

## 2017-09-26 DIAGNOSIS — R10.9 UNSPECIFIED ABDOMINAL PAIN: ICD-10-CM

## 2017-09-26 DIAGNOSIS — I10 ESSENTIAL (PRIMARY) HYPERTENSION: ICD-10-CM

## 2017-09-26 DIAGNOSIS — F17.200 NICOTINE DEPENDENCE, UNSPECIFIED, UNCOMPLICATED: ICD-10-CM

## 2017-09-28 DIAGNOSIS — K57.90 DIVERTICULOSIS OF INTESTINE, PART UNSPECIFIED, WITHOUT PERFORATION OR ABSCESS WITHOUT BLEEDING: ICD-10-CM

## 2017-09-28 DIAGNOSIS — K56.5 INTESTINAL ADHESIONS [BANDS] WITH OBSTRUCTION (POSTINFECTION): ICD-10-CM

## 2018-01-17 PROBLEM — I10 ESSENTIAL (PRIMARY) HYPERTENSION: Chronic | Status: ACTIVE | Noted: 2017-09-19

## 2018-01-17 PROBLEM — K57.92 DIVERTICULITIS OF INTESTINE, PART UNSPECIFIED, WITHOUT PERFORATION OR ABSCESS WITHOUT BLEEDING: Chronic | Status: ACTIVE | Noted: 2017-09-19

## 2018-06-25 ENCOUNTER — OUTPATIENT (OUTPATIENT)
Dept: OUTPATIENT SERVICES | Facility: HOSPITAL | Age: 53
LOS: 1 days | End: 2018-06-25
Payer: COMMERCIAL

## 2018-06-25 ENCOUNTER — APPOINTMENT (OUTPATIENT)
Dept: PULMONOLOGY | Facility: CLINIC | Age: 53
End: 2018-06-25
Payer: COMMERCIAL

## 2018-06-25 VITALS
BODY MASS INDEX: 40.92 KG/M2 | TEMPERATURE: 99.1 F | HEIGHT: 68 IN | RESPIRATION RATE: 12 BRPM | DIASTOLIC BLOOD PRESSURE: 90 MMHG | OXYGEN SATURATION: 96 % | HEART RATE: 85 BPM | WEIGHT: 270 LBS | SYSTOLIC BLOOD PRESSURE: 120 MMHG

## 2018-06-25 DIAGNOSIS — Z90.49 ACQUIRED ABSENCE OF OTHER SPECIFIED PARTS OF DIGESTIVE TRACT: Chronic | ICD-10-CM

## 2018-06-25 DIAGNOSIS — Z87.01 PERSONAL HISTORY OF PNEUMONIA (RECURRENT): ICD-10-CM

## 2018-06-25 DIAGNOSIS — R06.83 SNORING: ICD-10-CM

## 2018-06-25 DIAGNOSIS — Z87.891 PERSONAL HISTORY OF NICOTINE DEPENDENCE: ICD-10-CM

## 2018-06-25 DIAGNOSIS — Z90.711 ACQUIRED ABSENCE OF UTERUS WITH REMAINING CERVICAL STUMP: Chronic | ICD-10-CM

## 2018-06-25 PROCEDURE — 99203 OFFICE O/P NEW LOW 30 MIN: CPT | Mod: 25

## 2018-06-25 PROCEDURE — 71046 X-RAY EXAM CHEST 2 VIEWS: CPT

## 2018-06-25 PROCEDURE — 71046 X-RAY EXAM CHEST 2 VIEWS: CPT | Mod: 26

## 2018-07-11 ENCOUNTER — OUTPATIENT (OUTPATIENT)
Dept: OUTPATIENT SERVICES | Facility: HOSPITAL | Age: 53
LOS: 1 days | End: 2018-07-11

## 2018-07-11 ENCOUNTER — APPOINTMENT (OUTPATIENT)
Dept: PULMONOLOGY | Facility: CLINIC | Age: 53
End: 2018-07-11

## 2018-07-11 DIAGNOSIS — Z90.49 ACQUIRED ABSENCE OF OTHER SPECIFIED PARTS OF DIGESTIVE TRACT: Chronic | ICD-10-CM

## 2018-07-11 DIAGNOSIS — Z90.711 ACQUIRED ABSENCE OF UTERUS WITH REMAINING CERVICAL STUMP: Chronic | ICD-10-CM

## 2019-09-13 ENCOUNTER — EMERGENCY (EMERGENCY)
Facility: HOSPITAL | Age: 54
LOS: 1 days | Discharge: HOME | End: 2019-09-13
Attending: EMERGENCY MEDICINE | Admitting: EMERGENCY MEDICINE
Payer: COMMERCIAL

## 2019-09-13 VITALS
WEIGHT: 268.96 LBS | DIASTOLIC BLOOD PRESSURE: 87 MMHG | HEART RATE: 92 BPM | RESPIRATION RATE: 20 BRPM | TEMPERATURE: 98 F | OXYGEN SATURATION: 99 % | SYSTOLIC BLOOD PRESSURE: 122 MMHG | HEIGHT: 68 IN

## 2019-09-13 VITALS
SYSTOLIC BLOOD PRESSURE: 150 MMHG | TEMPERATURE: 98 F | HEART RATE: 75 BPM | OXYGEN SATURATION: 98 % | RESPIRATION RATE: 18 BRPM | DIASTOLIC BLOOD PRESSURE: 95 MMHG

## 2019-09-13 DIAGNOSIS — Z90.49 ACQUIRED ABSENCE OF OTHER SPECIFIED PARTS OF DIGESTIVE TRACT: Chronic | ICD-10-CM

## 2019-09-13 DIAGNOSIS — Z90.711 ACQUIRED ABSENCE OF UTERUS WITH REMAINING CERVICAL STUMP: Chronic | ICD-10-CM

## 2019-09-13 PROCEDURE — 84484 ASSAY OF TROPONIN QUANT: CPT

## 2019-09-13 PROCEDURE — 74177 CT ABD & PELVIS W/CONTRAST: CPT | Mod: 26

## 2019-09-13 PROCEDURE — 71046 X-RAY EXAM CHEST 2 VIEWS: CPT

## 2019-09-13 PROCEDURE — 96375 TX/PRO/DX INJ NEW DRUG ADDON: CPT

## 2019-09-13 PROCEDURE — 83690 ASSAY OF LIPASE: CPT

## 2019-09-13 PROCEDURE — 81003 URINALYSIS AUTO W/O SCOPE: CPT

## 2019-09-13 PROCEDURE — 36415 COLL VENOUS BLD VENIPUNCTURE: CPT

## 2019-09-13 PROCEDURE — 99284 EMERGENCY DEPT VISIT MOD MDM: CPT | Mod: 25

## 2019-09-13 PROCEDURE — 85025 COMPLETE CBC W/AUTO DIFF WBC: CPT

## 2019-09-13 PROCEDURE — 99285 EMERGENCY DEPT VISIT HI MDM: CPT

## 2019-09-13 PROCEDURE — 74177 CT ABD & PELVIS W/CONTRAST: CPT

## 2019-09-13 PROCEDURE — 93010 ELECTROCARDIOGRAM REPORT: CPT

## 2019-09-13 PROCEDURE — 71046 X-RAY EXAM CHEST 2 VIEWS: CPT | Mod: 26

## 2019-09-13 PROCEDURE — 93005 ELECTROCARDIOGRAM TRACING: CPT

## 2019-09-13 PROCEDURE — 96374 THER/PROPH/DIAG INJ IV PUSH: CPT | Mod: XU

## 2019-09-13 PROCEDURE — 80053 COMPREHEN METABOLIC PANEL: CPT

## 2019-09-13 RX ORDER — KETOROLAC TROMETHAMINE 30 MG/ML
15 SYRINGE (ML) INJECTION ONCE
Refills: 0 | Status: DISCONTINUED | OUTPATIENT
Start: 2019-09-13 | End: 2019-09-13

## 2019-09-13 RX ORDER — IOHEXOL 300 MG/ML
30 INJECTION, SOLUTION INTRAVENOUS ONCE
Refills: 0 | Status: COMPLETED | OUTPATIENT
Start: 2019-09-13 | End: 2019-09-13

## 2019-09-13 RX ORDER — ONDANSETRON 8 MG/1
1 TABLET, FILM COATED ORAL
Qty: 12 | Refills: 0
Start: 2019-09-13 | End: 2019-09-15

## 2019-09-13 RX ORDER — METOCLOPRAMIDE HCL 10 MG
10 TABLET ORAL ONCE
Refills: 0 | Status: COMPLETED | OUTPATIENT
Start: 2019-09-13 | End: 2019-09-13

## 2019-09-13 RX ORDER — SODIUM CHLORIDE 9 MG/ML
1000 INJECTION INTRAMUSCULAR; INTRAVENOUS; SUBCUTANEOUS ONCE
Refills: 0 | Status: COMPLETED | OUTPATIENT
Start: 2019-09-13 | End: 2019-09-13

## 2019-09-13 RX ORDER — MORPHINE SULFATE 50 MG/1
4 CAPSULE, EXTENDED RELEASE ORAL ONCE
Refills: 0 | Status: DISCONTINUED | OUTPATIENT
Start: 2019-09-13 | End: 2019-09-13

## 2019-09-13 RX ORDER — ONDANSETRON 8 MG/1
4 TABLET, FILM COATED ORAL ONCE
Refills: 0 | Status: COMPLETED | OUTPATIENT
Start: 2019-09-13 | End: 2019-09-13

## 2019-09-13 RX ADMIN — SODIUM CHLORIDE 1000 MILLILITER(S): 9 INJECTION INTRAMUSCULAR; INTRAVENOUS; SUBCUTANEOUS at 17:51

## 2019-09-13 RX ADMIN — Medication 15 MILLIGRAM(S): at 17:51

## 2019-09-13 RX ADMIN — Medication 10 MILLIGRAM(S): at 20:18

## 2019-09-13 RX ADMIN — IOHEXOL 30 MILLILITER(S): 300 INJECTION, SOLUTION INTRAVENOUS at 17:52

## 2019-09-13 RX ADMIN — MORPHINE SULFATE 4 MILLIGRAM(S): 50 CAPSULE, EXTENDED RELEASE ORAL at 18:25

## 2019-09-13 RX ADMIN — ONDANSETRON 4 MILLIGRAM(S): 8 TABLET, FILM COATED ORAL at 17:51

## 2019-09-13 NOTE — ED PROVIDER NOTE - GASTROINTESTINAL, MLM
Abdomen soft, + tenderness to left abdomen. no guarding. no rebound. no distention. no cva tenderness. Abdomen soft, + tenderness to left abdomen. no guarding. no rebound. no distention. no cva tenderness.. rectal: brown colored stool. no blood. + external hemorrhoid

## 2019-09-13 NOTE — ED ADULT TRIAGE NOTE - ESI TRIAGE ACUITY LEVEL, MLM
Problem: Patient Care Overview  Goal: Plan of Care/Patient Progress Review  Discharge Planner PT   Patient plan for discharge: To parent's home  Current status: PT evaluation complete and treatment initiated. Limited by pain but motivated. Educated on precautions following surgery, verbalized and demonstrated fairly good understanding overall. Increased time needed for all mobility. Completes supine<>sit transfer with use of log roll technique, side rail, HOB slightly elevated and SBA. Completes sit<>stand transfer with CGA and use of walker. Tolerated ambulating in chavarria with CGA pushing IV pole, trunk flexion noted throughout d/t abdominal pain.   Barriers to return to prior living situation: pain, stairs, precautions, independence with mobility  Recommendations for discharge: To parent's home with assist  Rationale for recommendations: Good support system. Anticipate patient will progress fairly quickly       Entered by: Alise Chacon 01/19/2018 10:37 AM            3

## 2019-09-13 NOTE — ED ADULT NURSE NOTE - CHPI ED NUR SYMPTOMS NEG
no blood in stool/no burning urination/no fever/no hematuria/no vomiting/no chills/no nausea/no dysuria

## 2019-09-13 NOTE — ED PROVIDER NOTE - PATIENT PORTAL LINK FT
You can access the FollowMyHealth Patient Portal offered by [facility] by registering at the following website: [email or web address]. By joining [email or web address]’s FollowMyHealth portal, you will also be able to view your health information using other applications (apps) compatible with our system.

## 2019-09-13 NOTE — ED PROVIDER NOTE - ATTENDING CONTRIBUTION TO CARE
53F pmh diverticulitis, HTN p/w abd pain for 1 day, diffuse, dull, achy, +bloating & UOP. No sob, no cp, no n/v, no f/c. No black/bloody stools. CT w/o acute findings - notes diverticulosis but no diverticulitis. Feeling much improved, no acute life threatening illness. Pt seeking discharge.

## 2019-09-13 NOTE — ED PROVIDER NOTE - OBJECTIVE STATEMENT
54 y/o female with hx of diverticulitis, HTN c/o abd pain x 1 day. pt states pain began yesterday  and is diffuse dull achy pain. +bloating and increase urination. no dysuria or hematuria. no fever or chills. pt notes blood tinged stool.  no melena. +_nausea and vomiting. no lower back pain. Also pt notes pain to left upper back with movement and deep breath. no anterior chest pain. no sob or wheezing. no cough. no leg pain or swelling. no further complaints.

## 2019-09-13 NOTE — ED ADULT TRIAGE NOTE - CHIEF COMPLAINT QUOTE
pt c/o generalized abdominal pain, diarrhea, blood in the stool, chest pain for the past few days. pt states " I think I have a diverticulosis flare up" I spoke to my doctor and he told me to come. ekg in progress.

## 2019-09-13 NOTE — ED ADULT NURSE REASSESSMENT NOTE - NS ED NURSE REASSESS COMMENT FT1
pt. medicated for nausea, h/a, abd. pain as noted, shaina. well, assessment on-going, awaiting further orders, updated that we are awaiting final CT results, with understanding verbalized

## 2019-09-13 NOTE — ED PROVIDER NOTE - CLINICAL SUMMARY MEDICAL DECISION MAKING FREE TEXT BOX
abd pain and upper back pain. pt well appearing. VSS.  a febrile. labs noted. cxr no acute pathology. ecg no ischemic changes. ? diverticulitis. dispo pending ct results.

## 2019-09-13 NOTE — ED PROVIDER NOTE - NSFOLLOWUPINSTRUCTIONS_ED_ALL_ED_FT
Immediately return to the Emergency Department or call 911 for any high fever, trouble breathing, severe vomiting, worsening pain, or any other concerns.     Abdominal Pain    Many things can cause abdominal pain. Many times, abdominal pain is not caused by a disease and will improve without treatment. Your health care provider will do a physical exam to determine if there is a dangerous cause of your pain; blood tests and imaging may help determine the cause of your pain. However, in many cases, no cause may be found and you may need further testing as an outpatient. Monitor your abdominal pain for any changes.     SEEK IMMEDIATE MEDICAL CARE IF YOU HAVE ANY OF THE FOLLOWING SYMPTOMS: worsening abdominal pain, uncontrollable vomiting, profuse diarrhea, inability to have bowel movements or pass gas, black or bloody stools, fever accompanying chest pain or back pain, or fainting. These symptoms may represent a serious problem that is an emergency. Do not wait to see if the symptoms will go away. Get medical help right away. Call 911 and do not drive yourself to the hospital.

## 2019-09-18 DIAGNOSIS — R10.84 GENERALIZED ABDOMINAL PAIN: ICD-10-CM

## 2019-09-18 DIAGNOSIS — Z79.899 OTHER LONG TERM (CURRENT) DRUG THERAPY: ICD-10-CM

## 2019-09-18 DIAGNOSIS — Z88.5 ALLERGY STATUS TO NARCOTIC AGENT: ICD-10-CM

## 2019-09-18 DIAGNOSIS — R35.0 FREQUENCY OF MICTURITION: ICD-10-CM

## 2019-09-18 DIAGNOSIS — R14.0 ABDOMINAL DISTENSION (GASEOUS): ICD-10-CM

## 2019-09-18 DIAGNOSIS — R11.2 NAUSEA WITH VOMITING, UNSPECIFIED: ICD-10-CM

## 2019-09-18 DIAGNOSIS — I10 ESSENTIAL (PRIMARY) HYPERTENSION: ICD-10-CM

## 2020-10-22 ENCOUNTER — APPOINTMENT (OUTPATIENT)
Dept: BARIATRICS | Facility: CLINIC | Age: 55
End: 2020-10-22
Payer: COMMERCIAL

## 2020-10-22 ENCOUNTER — APPOINTMENT (OUTPATIENT)
Dept: BARIATRICS | Facility: CLINIC | Age: 55
End: 2020-10-22

## 2020-10-22 VITALS — BODY MASS INDEX: 41.07 KG/M2 | HEIGHT: 68 IN | WEIGHT: 271 LBS

## 2020-10-22 VITALS — HEIGHT: 66 IN | BODY MASS INDEX: 43.74 KG/M2

## 2020-10-22 DIAGNOSIS — R06.00 DYSPNEA, UNSPECIFIED: ICD-10-CM

## 2020-10-22 PROCEDURE — 99203 OFFICE O/P NEW LOW 30 MIN: CPT | Mod: 95

## 2020-11-02 ENCOUNTER — APPOINTMENT (OUTPATIENT)
Dept: BARIATRICS | Facility: CLINIC | Age: 55
End: 2020-11-02
Payer: COMMERCIAL

## 2020-11-02 PROCEDURE — 97802 MEDICAL NUTRITION INDIV IN: CPT

## 2020-11-02 PROCEDURE — 99072 ADDL SUPL MATRL&STAF TM PHE: CPT

## 2020-11-03 PROBLEM — R06.00 DYSPNEA ON EXERTION: Status: ACTIVE | Noted: 2018-06-25

## 2020-11-03 NOTE — HISTORY OF PRESENT ILLNESS
[Home] : at home, [unfilled] , at the time of the visit. [Other Location: e.g. Home (Enter Location, City,State)___] : at [unfilled] [Verbal consent obtained from patient] : the patient, [unfilled] [de-identified] : Patient is a 55 year old female with along history of morbid obesity not responsive to multiple dietary regimens. She has a BMI of 43.7 and weight-related comorbidities including obstructive sleep apnea.

## 2020-11-03 NOTE — ASSESSMENT
[FreeTextEntry1] : Patient meets the NIH criteria for bariatric surgery and would like to have a laparoscopic sleeve gastrectomy. I have reviewed the risks and benefits of the procedure with the patient, and she understands this information fully.

## 2020-11-03 NOTE — REVIEW OF SYSTEMS
[Patient Intake Form Reviewed] : Patient intake form was reviewed [Fever] : no fever [Chills] : no chills [Night Sweats] : no night sweats [Fatigue] : fatigue [Recent Change In Weight] : ~T no recent weight change [Shortness Of Breath] : shortness of breath [Wheezing] : no wheezing [Cough] : no cough [SOB on Exertion] : shortness of breath during exertion [Negative] : Psychiatric

## 2020-11-09 VITALS — BODY MASS INDEX: 43.9 KG/M2 | WEIGHT: 272 LBS

## 2020-11-16 ENCOUNTER — NON-APPOINTMENT (OUTPATIENT)
Age: 55
End: 2020-11-16

## 2020-12-03 ENCOUNTER — APPOINTMENT (OUTPATIENT)
Dept: BARIATRICS | Facility: CLINIC | Age: 55
End: 2020-12-03
Payer: COMMERCIAL

## 2020-12-03 VITALS — BODY MASS INDEX: 43.26 KG/M2 | WEIGHT: 268 LBS

## 2020-12-03 DIAGNOSIS — G47.33 OBSTRUCTIVE SLEEP APNEA (ADULT) (PEDIATRIC): ICD-10-CM

## 2020-12-03 PROCEDURE — 97803 MED NUTRITION INDIV SUBSEQ: CPT

## 2020-12-03 PROCEDURE — 99072 ADDL SUPL MATRL&STAF TM PHE: CPT

## 2021-01-05 VITALS — BODY MASS INDEX: 42.61 KG/M2 | WEIGHT: 264 LBS

## 2021-01-07 ENCOUNTER — APPOINTMENT (OUTPATIENT)
Dept: BARIATRICS | Facility: CLINIC | Age: 56
End: 2021-01-07
Payer: COMMERCIAL

## 2021-01-07 PROCEDURE — 97803 MED NUTRITION INDIV SUBSEQ: CPT | Mod: 95

## 2021-01-21 ENCOUNTER — TRANSCRIPTION ENCOUNTER (OUTPATIENT)
Age: 56
End: 2021-01-21

## 2021-02-04 ENCOUNTER — RESULT REVIEW (OUTPATIENT)
Age: 56
End: 2021-02-04

## 2021-02-04 ENCOUNTER — OUTPATIENT (OUTPATIENT)
Dept: OUTPATIENT SERVICES | Facility: HOSPITAL | Age: 56
LOS: 1 days | End: 2021-02-04
Payer: COMMERCIAL

## 2021-02-04 DIAGNOSIS — Z90.711 ACQUIRED ABSENCE OF UTERUS WITH REMAINING CERVICAL STUMP: Chronic | ICD-10-CM

## 2021-02-04 DIAGNOSIS — Z90.49 ACQUIRED ABSENCE OF OTHER SPECIFIED PARTS OF DIGESTIVE TRACT: Chronic | ICD-10-CM

## 2021-02-04 PROCEDURE — 71046 X-RAY EXAM CHEST 2 VIEWS: CPT | Mod: 26

## 2021-02-04 PROCEDURE — 71046 X-RAY EXAM CHEST 2 VIEWS: CPT

## 2021-03-03 ENCOUNTER — APPOINTMENT (OUTPATIENT)
Dept: BARIATRICS | Facility: CLINIC | Age: 56
End: 2021-03-03
Payer: COMMERCIAL

## 2021-03-03 VITALS
SYSTOLIC BLOOD PRESSURE: 107 MMHG | HEIGHT: 65 IN | HEART RATE: 97 BPM | BODY MASS INDEX: 45.15 KG/M2 | TEMPERATURE: 97.2 F | WEIGHT: 271 LBS | DIASTOLIC BLOOD PRESSURE: 77 MMHG | OXYGEN SATURATION: 96 %

## 2021-03-03 PROCEDURE — 99212 OFFICE O/P EST SF 10 MIN: CPT

## 2021-03-03 PROCEDURE — 99072 ADDL SUPL MATRL&STAF TM PHE: CPT

## 2021-03-05 ENCOUNTER — LABORATORY RESULT (OUTPATIENT)
Age: 56
End: 2021-03-05

## 2021-03-05 VITALS
OXYGEN SATURATION: 97 % | WEIGHT: 268.96 LBS | TEMPERATURE: 98 F | HEART RATE: 79 BPM | DIASTOLIC BLOOD PRESSURE: 76 MMHG | RESPIRATION RATE: 16 BRPM | SYSTOLIC BLOOD PRESSURE: 113 MMHG | HEIGHT: 65 IN

## 2021-03-05 RX ORDER — INFLUENZA VIRUS VACCINE 15; 15; 15; 15 UG/.5ML; UG/.5ML; UG/.5ML; UG/.5ML
0.5 SUSPENSION INTRAMUSCULAR ONCE
Refills: 0 | Status: DISCONTINUED | OUTPATIENT
Start: 2021-03-08 | End: 2021-03-11

## 2021-03-05 RX ORDER — CYCLOBENZAPRINE HYDROCHLORIDE 10 MG/1
1 TABLET, FILM COATED ORAL
Qty: 0 | Refills: 0 | DISCHARGE

## 2021-03-07 ENCOUNTER — TRANSCRIPTION ENCOUNTER (OUTPATIENT)
Age: 56
End: 2021-03-07

## 2021-03-08 ENCOUNTER — APPOINTMENT (OUTPATIENT)
Dept: BARIATRICS | Facility: HOSPITAL | Age: 56
End: 2021-03-08

## 2021-03-08 ENCOUNTER — RESULT REVIEW (OUTPATIENT)
Age: 56
End: 2021-03-08

## 2021-03-08 ENCOUNTER — INPATIENT (INPATIENT)
Facility: HOSPITAL | Age: 56
LOS: 2 days | Discharge: HOME | DRG: 620 | End: 2021-03-11
Attending: SURGERY | Admitting: SURGERY
Payer: COMMERCIAL

## 2021-03-08 DIAGNOSIS — Z90.711 ACQUIRED ABSENCE OF UTERUS WITH REMAINING CERVICAL STUMP: Chronic | ICD-10-CM

## 2021-03-08 DIAGNOSIS — Z41.9 ENCOUNTER FOR PROCEDURE FOR PURPOSES OTHER THAN REMEDYING HEALTH STATE, UNSPECIFIED: Chronic | ICD-10-CM

## 2021-03-08 DIAGNOSIS — Z90.49 ACQUIRED ABSENCE OF OTHER SPECIFIED PARTS OF DIGESTIVE TRACT: Chronic | ICD-10-CM

## 2021-03-08 LAB
BLD GP AB SCN SERPL QL: NEGATIVE — SIGNIFICANT CHANGE UP
HCT VFR BLD CALC: 42.9 % — SIGNIFICANT CHANGE UP (ref 34.5–45)
HGB BLD-MCNC: 13.4 G/DL — SIGNIFICANT CHANGE UP (ref 11.5–15.5)
MCHC RBC-ENTMCNC: 29.3 PG — SIGNIFICANT CHANGE UP (ref 27–34)
MCHC RBC-ENTMCNC: 31.2 GM/DL — LOW (ref 32–36)
MCV RBC AUTO: 93.9 FL — SIGNIFICANT CHANGE UP (ref 80–100)
NRBC # BLD: 0 /100 WBCS — SIGNIFICANT CHANGE UP (ref 0–0)
PLATELET # BLD AUTO: 222 K/UL — SIGNIFICANT CHANGE UP (ref 150–400)
RBC # BLD: 4.57 M/UL — SIGNIFICANT CHANGE UP (ref 3.8–5.2)
RBC # FLD: 12.2 % — SIGNIFICANT CHANGE UP (ref 10.3–14.5)
RH IG SCN BLD-IMP: POSITIVE — SIGNIFICANT CHANGE UP
WBC # BLD: 9.79 K/UL — SIGNIFICANT CHANGE UP (ref 3.8–10.5)
WBC # FLD AUTO: 9.79 K/UL — SIGNIFICANT CHANGE UP (ref 3.8–10.5)

## 2021-03-08 PROCEDURE — 43775 LAP SLEEVE GASTRECTOMY: CPT | Mod: GC

## 2021-03-08 PROCEDURE — 88307 TISSUE EXAM BY PATHOLOGIST: CPT | Mod: 26

## 2021-03-08 PROCEDURE — 43775 LAP SLEEVE GASTRECTOMY: CPT | Mod: AS

## 2021-03-08 RX ORDER — SCOPALAMINE 1 MG/3D
1 PATCH, EXTENDED RELEASE TRANSDERMAL ONCE
Refills: 0 | Status: COMPLETED | OUTPATIENT
Start: 2021-03-08 | End: 2021-03-08

## 2021-03-08 RX ORDER — ACETAMINOPHEN 500 MG
1000 TABLET ORAL ONCE
Refills: 0 | Status: COMPLETED | OUTPATIENT
Start: 2021-03-08 | End: 2021-03-08

## 2021-03-08 RX ORDER — GABAPENTIN 400 MG/1
600 CAPSULE ORAL
Refills: 0 | Status: DISCONTINUED | OUTPATIENT
Start: 2021-03-08 | End: 2021-03-11

## 2021-03-08 RX ORDER — ESCITALOPRAM OXALATE 10 MG/1
1 TABLET, FILM COATED ORAL
Qty: 0 | Refills: 0 | DISCHARGE

## 2021-03-08 RX ORDER — NICOTINE POLACRILEX 2 MG
0 GUM BUCCAL
Qty: 0 | Refills: 0 | DISCHARGE

## 2021-03-08 RX ORDER — ASPIRIN/CALCIUM CARB/MAGNESIUM 324 MG
81 TABLET ORAL DAILY
Refills: 0 | Status: DISCONTINUED | OUTPATIENT
Start: 2021-03-08 | End: 2021-03-08

## 2021-03-08 RX ORDER — ASPIRIN/CALCIUM CARB/MAGNESIUM 324 MG
1 TABLET ORAL
Qty: 0 | Refills: 0 | DISCHARGE

## 2021-03-08 RX ORDER — GABAPENTIN 400 MG/1
0 CAPSULE ORAL
Qty: 0 | Refills: 0 | DISCHARGE

## 2021-03-08 RX ORDER — ZOLPIDEM TARTRATE 10 MG/1
5 TABLET ORAL AT BEDTIME
Refills: 0 | Status: DISCONTINUED | OUTPATIENT
Start: 2021-03-08 | End: 2021-03-11

## 2021-03-08 RX ORDER — KETOROLAC TROMETHAMINE 30 MG/ML
15 SYRINGE (ML) INJECTION EVERY 6 HOURS
Refills: 0 | Status: DISCONTINUED | OUTPATIENT
Start: 2021-03-08 | End: 2021-03-09

## 2021-03-08 RX ORDER — TRAZODONE HCL 50 MG
0 TABLET ORAL
Qty: 0 | Refills: 0 | DISCHARGE

## 2021-03-08 RX ORDER — TRAZODONE HCL 50 MG
150 TABLET ORAL DAILY
Refills: 0 | Status: DISCONTINUED | OUTPATIENT
Start: 2021-03-08 | End: 2021-03-11

## 2021-03-08 RX ORDER — ONDANSETRON 8 MG/1
4 TABLET, FILM COATED ORAL EVERY 6 HOURS
Refills: 0 | Status: DISCONTINUED | OUTPATIENT
Start: 2021-03-08 | End: 2021-03-11

## 2021-03-08 RX ORDER — PANTOPRAZOLE SODIUM 20 MG/1
40 TABLET, DELAYED RELEASE ORAL DAILY
Refills: 0 | Status: DISCONTINUED | OUTPATIENT
Start: 2021-03-08 | End: 2021-03-11

## 2021-03-08 RX ORDER — ESCITALOPRAM OXALATE 10 MG/1
20 TABLET, FILM COATED ORAL DAILY
Refills: 0 | Status: DISCONTINUED | OUTPATIENT
Start: 2021-03-08 | End: 2021-03-11

## 2021-03-08 RX ORDER — ACETAMINOPHEN 500 MG
650 TABLET ORAL EVERY 6 HOURS
Refills: 0 | Status: DISCONTINUED | OUTPATIENT
Start: 2021-03-08 | End: 2021-03-11

## 2021-03-08 RX ORDER — ENOXAPARIN SODIUM 100 MG/ML
40 INJECTION SUBCUTANEOUS ONCE
Refills: 0 | Status: COMPLETED | OUTPATIENT
Start: 2021-03-08 | End: 2021-03-08

## 2021-03-08 RX ORDER — ZOLPIDEM TARTRATE 10 MG/1
1 TABLET ORAL
Qty: 0 | Refills: 0 | DISCHARGE

## 2021-03-08 RX ORDER — SODIUM CHLORIDE 9 MG/ML
1000 INJECTION, SOLUTION INTRAVENOUS
Refills: 0 | Status: DISCONTINUED | OUTPATIENT
Start: 2021-03-08 | End: 2021-03-09

## 2021-03-08 RX ORDER — ASPIRIN/CALCIUM CARB/MAGNESIUM 324 MG
81 TABLET ORAL DAILY
Refills: 0 | Status: DISCONTINUED | OUTPATIENT
Start: 2021-03-09 | End: 2021-03-11

## 2021-03-08 RX ORDER — NICOTINE POLACRILEX 2 MG
1 GUM BUCCAL DAILY
Refills: 0 | Status: DISCONTINUED | OUTPATIENT
Start: 2021-03-08 | End: 2021-03-11

## 2021-03-08 RX ORDER — GABAPENTIN 400 MG/1
300 CAPSULE ORAL ONCE
Refills: 0 | Status: COMPLETED | OUTPATIENT
Start: 2021-03-08 | End: 2021-03-08

## 2021-03-08 RX ADMIN — ZOLPIDEM TARTRATE 5 MILLIGRAM(S): 10 TABLET ORAL at 21:34

## 2021-03-08 RX ADMIN — ENOXAPARIN SODIUM 40 MILLIGRAM(S): 100 INJECTION SUBCUTANEOUS at 09:51

## 2021-03-08 RX ADMIN — SCOPALAMINE 1 PATCH: 1 PATCH, EXTENDED RELEASE TRANSDERMAL at 19:03

## 2021-03-08 RX ADMIN — GABAPENTIN 300 MILLIGRAM(S): 400 CAPSULE ORAL at 09:22

## 2021-03-08 RX ADMIN — Medication 15 MILLIGRAM(S): at 20:59

## 2021-03-08 RX ADMIN — Medication 400 MILLIGRAM(S): at 22:30

## 2021-03-08 RX ADMIN — GABAPENTIN 600 MILLIGRAM(S): 400 CAPSULE ORAL at 15:54

## 2021-03-08 RX ADMIN — Medication 15 MILLIGRAM(S): at 20:39

## 2021-03-08 RX ADMIN — Medication 400 MILLIGRAM(S): at 16:28

## 2021-03-08 RX ADMIN — Medication 1000 MILLIGRAM(S): at 22:59

## 2021-03-08 RX ADMIN — SCOPALAMINE 1 PATCH: 1 PATCH, EXTENDED RELEASE TRANSDERMAL at 09:22

## 2021-03-08 RX ADMIN — ZOLPIDEM TARTRATE 5 MILLIGRAM(S): 10 TABLET ORAL at 22:30

## 2021-03-08 NOTE — BRIEF OPERATIVE NOTE - OPERATION/FINDINGS
Veres needle insufflation, Abdomen inspected. Robot docked, and targeted. Lesser sac entered. Short gastrics divided with Vessel sealer. Stomach divided over 38Fr bougie, using stapler. 1 green load, 4 blue loads. The stomach staple line was oversewn with omentum. The abdomen was inspected. Hemostasis achieved. Robot undocked. The stomach remnant was removed. Fascia was closed with endoclose suture. Skin closed with monocryl.
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
## 2021-03-08 NOTE — H&P ADULT - NSICDXPASTSURGICALHX_GEN_ALL_CORE_FT
PAST SURGICAL HISTORY:  S/P cholecystectomy stones    S/P subtotal hysterectomy 15 years ago    Surgery, elective right knee replacemnt, 9 years ago

## 2021-03-08 NOTE — H&P ADULT - NSHPSOCIALHISTORY_GEN_ALL_CORE
Quit smoking February 1st 2021, smoked for many years less than a pack/day, social EtOH use, no IVDU, smokes marijuana

## 2021-03-08 NOTE — H&P ADULT - HISTORY OF PRESENT ILLNESS
56 yo F w/ PMH of HTN, diverticulosis, gallstones, anxiety and PSH of partial hysterectomy presents for elective robotic-assisted sleeve gastrectomy.

## 2021-03-08 NOTE — H&P ADULT - ASSESSMENT
56 yo F w/ PMH of HTN, diverticulosis, gallstones, anxiety and PSH of partial hysterectomy presents for elective robotic-assisted sleeve gastrectomy.    - Admit to Team 2 General Surgery Dr. Barker  - IVF/BCLD  - Pain and Nausea control  - SCDs for DVT ppx.  - IS/OOBA  - CBC 6 hours post-op  - SQH POD1 if Hgb stable  - Nutrition consult in AM  - AM labs

## 2021-03-08 NOTE — H&P ADULT - NSICDXFAMILYHX_GEN_ALL_CORE_FT
FAMILY HISTORY:  Family history of diabetes mellitus (DM)  Family history of heart attack  FH: HTN (hypertension)

## 2021-03-08 NOTE — H&P ADULT - NSHPPHYSICALEXAM_GEN_ALL_CORE
Gen: NAD, resting comfortably in bed  Pulm: Good inspiratory effort, nonlabored breathing  C/V: NSR, S1, S2 present  Abd: Soft, NT/ND, no rebound tenderness, guarding, rigidity  Extrem: WWP, no edema   Neuro: AAOx3

## 2021-03-09 LAB
ANION GAP SERPL CALC-SCNC: 8 MMOL/L — SIGNIFICANT CHANGE UP (ref 5–17)
ANION GAP SERPL CALC-SCNC: 9 MMOL/L — SIGNIFICANT CHANGE UP (ref 5–17)
APPEARANCE UR: CLEAR — SIGNIFICANT CHANGE UP
BACTERIA # UR AUTO: PRESENT /HPF
BILIRUB UR-MCNC: NEGATIVE — SIGNIFICANT CHANGE UP
BUN SERPL-MCNC: 27 MG/DL — HIGH (ref 7–23)
BUN SERPL-MCNC: 29 MG/DL — HIGH (ref 7–23)
CALCIUM SERPL-MCNC: 8.1 MG/DL — LOW (ref 8.4–10.5)
CALCIUM SERPL-MCNC: 8.4 MG/DL — SIGNIFICANT CHANGE UP (ref 8.4–10.5)
CHLORIDE SERPL-SCNC: 103 MMOL/L — SIGNIFICANT CHANGE UP (ref 96–108)
CHLORIDE SERPL-SCNC: 103 MMOL/L — SIGNIFICANT CHANGE UP (ref 96–108)
CO2 SERPL-SCNC: 24 MMOL/L — SIGNIFICANT CHANGE UP (ref 22–31)
CO2 SERPL-SCNC: 25 MMOL/L — SIGNIFICANT CHANGE UP (ref 22–31)
COLOR SPEC: YELLOW — SIGNIFICANT CHANGE UP
COMMENT - URINE: SIGNIFICANT CHANGE UP
CREAT SERPL-MCNC: 1.48 MG/DL — HIGH (ref 0.5–1.3)
CREAT SERPL-MCNC: 1.51 MG/DL — HIGH (ref 0.5–1.3)
DIFF PNL FLD: NEGATIVE — SIGNIFICANT CHANGE UP
EPI CELLS # UR: ABNORMAL /HPF (ref 0–5)
GLUCOSE SERPL-MCNC: 101 MG/DL — HIGH (ref 70–99)
GLUCOSE SERPL-MCNC: 117 MG/DL — HIGH (ref 70–99)
GLUCOSE UR QL: NEGATIVE — SIGNIFICANT CHANGE UP
HCT VFR BLD CALC: 35.5 % — SIGNIFICANT CHANGE UP (ref 34.5–45)
HCV AB S/CO SERPL IA: 0.07 S/CO — SIGNIFICANT CHANGE UP
HCV AB SERPL-IMP: SIGNIFICANT CHANGE UP
HGB BLD-MCNC: 11.5 G/DL — SIGNIFICANT CHANGE UP (ref 11.5–15.5)
KETONES UR-MCNC: NEGATIVE — SIGNIFICANT CHANGE UP
LEUKOCYTE ESTERASE UR-ACNC: ABNORMAL
MAGNESIUM SERPL-MCNC: 1.7 MG/DL — SIGNIFICANT CHANGE UP (ref 1.6–2.6)
MCHC RBC-ENTMCNC: 29.9 PG — SIGNIFICANT CHANGE UP (ref 27–34)
MCHC RBC-ENTMCNC: 32.4 GM/DL — SIGNIFICANT CHANGE UP (ref 32–36)
MCV RBC AUTO: 92.2 FL — SIGNIFICANT CHANGE UP (ref 80–100)
NITRITE UR-MCNC: NEGATIVE — SIGNIFICANT CHANGE UP
NRBC # BLD: 0 /100 WBCS — SIGNIFICANT CHANGE UP (ref 0–0)
OSMOLALITY UR: 518 MOSM/KG — SIGNIFICANT CHANGE UP (ref 300–900)
PH UR: 6 — SIGNIFICANT CHANGE UP (ref 5–8)
PHOSPHATE SERPL-MCNC: 4.2 MG/DL — SIGNIFICANT CHANGE UP (ref 2.5–4.5)
PLATELET # BLD AUTO: 200 K/UL — SIGNIFICANT CHANGE UP (ref 150–400)
POTASSIUM SERPL-MCNC: 4 MMOL/L — SIGNIFICANT CHANGE UP (ref 3.5–5.3)
POTASSIUM SERPL-MCNC: 4.4 MMOL/L — SIGNIFICANT CHANGE UP (ref 3.5–5.3)
POTASSIUM SERPL-SCNC: 4 MMOL/L — SIGNIFICANT CHANGE UP (ref 3.5–5.3)
POTASSIUM SERPL-SCNC: 4.4 MMOL/L — SIGNIFICANT CHANGE UP (ref 3.5–5.3)
POTASSIUM UR-SCNC: 21 MMOL/L — SIGNIFICANT CHANGE UP
PROT UR-MCNC: NEGATIVE MG/DL — SIGNIFICANT CHANGE UP
RBC # BLD: 3.85 M/UL — SIGNIFICANT CHANGE UP (ref 3.8–5.2)
RBC # FLD: 12.3 % — SIGNIFICANT CHANGE UP (ref 10.3–14.5)
RBC CASTS # UR COMP ASSIST: < 5 /HPF — SIGNIFICANT CHANGE UP
SODIUM SERPL-SCNC: 136 MMOL/L — SIGNIFICANT CHANGE UP (ref 135–145)
SODIUM SERPL-SCNC: 136 MMOL/L — SIGNIFICANT CHANGE UP (ref 135–145)
SODIUM UR-SCNC: 35 MMOL/L — SIGNIFICANT CHANGE UP
SP GR SPEC: 1.02 — SIGNIFICANT CHANGE UP (ref 1–1.03)
UROBILINOGEN FLD QL: 0.2 E.U./DL — SIGNIFICANT CHANGE UP
WBC # BLD: 9.35 K/UL — SIGNIFICANT CHANGE UP (ref 3.8–10.5)
WBC # FLD AUTO: 9.35 K/UL — SIGNIFICANT CHANGE UP (ref 3.8–10.5)
WBC UR QL: > 10 /HPF

## 2021-03-09 PROCEDURE — 99254 IP/OBS CNSLTJ NEW/EST MOD 60: CPT

## 2021-03-09 RX ORDER — SODIUM CHLORIDE 9 MG/ML
500 INJECTION, SOLUTION INTRAVENOUS ONCE
Refills: 0 | Status: COMPLETED | OUTPATIENT
Start: 2021-03-09 | End: 2021-03-09

## 2021-03-09 RX ORDER — SODIUM CHLORIDE 9 MG/ML
1000 INJECTION, SOLUTION INTRAVENOUS
Refills: 0 | Status: DISCONTINUED | OUTPATIENT
Start: 2021-03-09 | End: 2021-03-10

## 2021-03-09 RX ORDER — ACETAMINOPHEN 500 MG
1000 TABLET ORAL ONCE
Refills: 0 | Status: COMPLETED | OUTPATIENT
Start: 2021-03-09 | End: 2021-03-09

## 2021-03-09 RX ORDER — MAGNESIUM SULFATE 500 MG/ML
1 VIAL (ML) INJECTION ONCE
Refills: 0 | Status: COMPLETED | OUTPATIENT
Start: 2021-03-09 | End: 2021-03-09

## 2021-03-09 RX ORDER — HEPARIN SODIUM 5000 [USP'U]/ML
7500 INJECTION INTRAVENOUS; SUBCUTANEOUS EVERY 8 HOURS
Refills: 0 | Status: DISCONTINUED | OUTPATIENT
Start: 2021-03-09 | End: 2021-03-11

## 2021-03-09 RX ORDER — SODIUM CHLORIDE 9 MG/ML
1000 INJECTION, SOLUTION INTRAVENOUS
Refills: 0 | Status: DISCONTINUED | OUTPATIENT
Start: 2021-03-09 | End: 2021-03-09

## 2021-03-09 RX ADMIN — Medication 400 MILLIGRAM(S): at 20:25

## 2021-03-09 RX ADMIN — Medication 15 MILLIGRAM(S): at 05:16

## 2021-03-09 RX ADMIN — Medication 15 MILLIGRAM(S): at 13:00

## 2021-03-09 RX ADMIN — HEPARIN SODIUM 7500 UNIT(S): 5000 INJECTION INTRAVENOUS; SUBCUTANEOUS at 13:00

## 2021-03-09 RX ADMIN — ONDANSETRON 4 MILLIGRAM(S): 8 TABLET, FILM COATED ORAL at 09:19

## 2021-03-09 RX ADMIN — Medication 1000 MILLIGRAM(S): at 20:40

## 2021-03-09 RX ADMIN — SCOPALAMINE 1 PATCH: 1 PATCH, EXTENDED RELEASE TRANSDERMAL at 06:03

## 2021-03-09 RX ADMIN — HEPARIN SODIUM 7500 UNIT(S): 5000 INJECTION INTRAVENOUS; SUBCUTANEOUS at 21:53

## 2021-03-09 RX ADMIN — Medication 15 MILLIGRAM(S): at 05:46

## 2021-03-09 RX ADMIN — GABAPENTIN 600 MILLIGRAM(S): 400 CAPSULE ORAL at 20:24

## 2021-03-09 RX ADMIN — PANTOPRAZOLE SODIUM 40 MILLIGRAM(S): 20 TABLET, DELAYED RELEASE ORAL at 13:00

## 2021-03-09 RX ADMIN — Medication 400 MILLIGRAM(S): at 10:29

## 2021-03-09 RX ADMIN — Medication 15 MILLIGRAM(S): at 13:56

## 2021-03-09 RX ADMIN — Medication 1000 MILLIGRAM(S): at 04:59

## 2021-03-09 RX ADMIN — ZOLPIDEM TARTRATE 5 MILLIGRAM(S): 10 TABLET ORAL at 22:53

## 2021-03-09 RX ADMIN — ESCITALOPRAM OXALATE 20 MILLIGRAM(S): 10 TABLET, FILM COATED ORAL at 20:24

## 2021-03-09 RX ADMIN — Medication 100 GRAM(S): at 15:41

## 2021-03-09 RX ADMIN — Medication 150 MILLIGRAM(S): at 13:01

## 2021-03-09 RX ADMIN — SODIUM CHLORIDE 500 MILLILITER(S): 9 INJECTION, SOLUTION INTRAVENOUS at 09:20

## 2021-03-09 RX ADMIN — Medication 400 MILLIGRAM(S): at 04:37

## 2021-03-09 RX ADMIN — Medication 81 MILLIGRAM(S): at 13:02

## 2021-03-09 RX ADMIN — ZOLPIDEM TARTRATE 5 MILLIGRAM(S): 10 TABLET ORAL at 21:53

## 2021-03-09 RX ADMIN — Medication 1000 MILLIGRAM(S): at 10:30

## 2021-03-09 NOTE — CONSULT NOTE ADULT - SUBJECTIVE AND OBJECTIVE BOX
HPI:  54 yo F w/ PMH of HTN, diverticulosis, gallstones, anxiety and PSH of partial hysterectomy presents for elective robotic-assisted sleeve gastrectomy. Nephrology consulted for NI    ROS:  Otherwise negative, except as specified in HPI.    PMH:  Diverticulosis  Gallstones  Anxiety    PSH:  Partial Hysterectomy    FH:  Distant cousin on HD  Maternal and Paternal CAD    SH:  30pack yr smoking hx recently quit 2/2021    ALLERGIES:  percocet (rash)    MEDICATIONS:    VITAL SIGNS:  ICU Vital Signs Last 24 Hrs  T(C): 36.7 (09 Mar 2021 13:18), Max: 37.3 (08 Mar 2021 17:15)  T(F): 98.1 (09 Mar 2021 13:18), Max: 99.1 (08 Mar 2021 17:15)  HR: 63 (09 Mar 2021 13:18) (57 - 82)  BP: 126/83 (09 Mar 2021 13:18) (101/69 - 126/83)  BP(mean): --  ABP: --  ABP(mean): --  RR: 17 (09 Mar 2021 13:18) (16 - 18)  SpO2: 98% (09 Mar 2021 13:18) (95% - 99%)    CAPILLARY BLOOD GLUCOSE          PHYSICAL EXAM:  Constitutional: Middle aged  female, resting comfortably in bed, NAD  HEENT: NC/AT; PERRL, anicteric sclera; no oropharyngeal erythema or exudates; MMM  Neck: supple, no appreciable JVD  Respiratory: CTA B/L, no W/R/R; respirations appear non-labored, conversive in full sentences  Cardiovascular: +S1/S2, RRR  Gastrointestinal: abdomen is obese soft, NT/ND  Extremities: WWP; no clubbing, cyanosis or edema  Vascular: 2+ radial, femoral, and DP/PT pulses B/L  Dermatologic: skin normal color and turgor; no visible rashes  Neurological: aox3    LABS:                        11.5   9.35  )-----------( 200      ( 09 Mar 2021 06:15 )             35.5     03-09    136  |  103  |  29<H>  ----------------------------<  101<H>  4.0   |  25  |  1.48<H>    Ca    8.1<L>      09 Mar 2021 12:40  Phos  4.2     03-09  Mg     1.7     03-09                    EKG: Reviewed.    RADIOLOGY & ADDITIONAL TESTS: Reviewed.

## 2021-03-09 NOTE — CONSULT NOTE ADULT - ATTENDING COMMENTS
NI on CKD II baseline Cr 1-1.1 egfr 60-70 last checked pre-op 1 month ago. Post op gastric sleeve Cr 1.5 repeated, unclear if this is a hemodynamic mediated NI 2/2 surgery and multiple doses of ketorolac, or Cr was rising sometime in the last few weeks however history inconsistent with home nsaid use or dehydration. On ARB/thiazide that's being held, no recent dose change. No documented intra-op hypotension or excessive blood loss.  3.5L net positive volume status, tolerated liquid diet well per nurse, low suspicion for prerenal NI, would lower rate of IVF.   R/O urinary retention with post void bladder scan  Stop nsaids  Check urine Na, urine Cr and u/a to further evaluate NI

## 2021-03-09 NOTE — CONSULT NOTE ADULT - ASSESSMENT
54 yo F w/ PMH of HTN, diverticulosis, gallstones, anxiety and PSH of partial hysterectomy presents for elective robotic-assisted sleeve gastrectomy. Nephrology consulted for NI. Pre-operative sCr 1.16 on 2/4. Patient underwent sleeve gastrectomy on 3/8 w/o complications, review of OR flowsheets w/o evidence of hypotensive episodes. Patient currently with appropriate UOP and currently on standing LR at 200cc/hr. No recent contrast administration. Medication review showing that patient has received toradol 15mg x3. sCr slightly improved from 3/8 downtrended 1.51 to 1.48.     #NI - patient with sCr of 1.5 baseline 1.0. Etiology likely 2/2 NSAID use. Patient currently urinating no suprapubic fullness low suspicion for retention at this time. No documented episodes of hypotension that would suggest ATN.   - please dc toradol, can try tylenol for mild pain and oxycodone or morphine for moderate to severe pain   - avoid nephrotoxic meds  - f/u urine sodium, creatinine, osm and UA  - trend sCr  - strict I/O 56 yo F w/ PMH of HTN, diverticulosis, gallstones, anxiety and PSH of partial hysterectomy presents for elective robotic-assisted sleeve gastrectomy. Nephrology consulted for NI. Pre-operative sCr 1.16 on 2/4. Patient underwent sleeve gastrectomy on 3/8 w/o complications, review of OR flowsheets w/o evidence of hypotensive episodes. Patient currently with appropriate UOP and currently on standing LR at 200cc/hr. No recent contrast administration. Medication review showing that patient has received toradol 15mg x3. sCr slightly improved from 3/8 downtrended 1.51 to 1.48.     #NI on CKDII - patient with sCr of 1.5 baseline 1.0 and GFR ~70 . Etiology likely 2/2 NSAID use. Patient currently urinating no suprapubic fullness low suspicion for retention at this time. No documented episodes of hypotension that would suggest ischemic ATN.   - please dc toradol, can try tylenol for mild pain and oxycodone or morphine for moderate to severe pain   - avoid nephrotoxic meds  - f/u urine sodium, creatinine, osm and UA  - trend sCr  - strict I/O  - c/w IVF

## 2021-03-09 NOTE — DIETITIAN INITIAL EVALUATION ADULT. - OTHER CALCULATIONS
IBW used for calculations as pt >120% of IBW. Above energy needs calculated for wt maintenance (20-25kcal/kg).   Weeks 1-2 estimated needs: 566-679kcal/day (10-12kcal/kg), 68-85g pro/day (1.2-1.5g/kg), >/=64oz clear fluids.

## 2021-03-09 NOTE — DIETITIAN INITIAL EVALUATION ADULT. - OTHER INFO
55F with hx of HTN, diverticulosis, gallstones, anxiety and PSH of partial hysterectomy admitting for elective lap sleeve gastrectomy (3/8), now POD #1. On assessment, pt resting in bed. Currently on BARICLLIQ diet, tolerating PO. Pt had adequate PO intake this morning, consuming 3-4oz prior to 845am. Pain and nausea well controlled. Discussed volumes of various cup sizes on tray table and encouraged aiming for 4 oz/hr as tolerated. Prepared with protein shakes, and vitamins for after discharge. RD provided indepth edu on diet advancement and specific nutrient needs s/p LSG. NKFA. No dietary restrictions at home. Skin: surgical incisions. GI: WDL per flowsheet. RD to follow up per protocol.

## 2021-03-09 NOTE — PROGRESS NOTE ADULT - ASSESSMENT
56 yo F w/ PMH of HTN, diverticulosis, gallstones, anxiety and PSH of partial hysterectomy presents for elective robotic-assisted sleeve gastrectomy. Now s/p Robotic-assisted sleeve gastrectomy (3/8).    - IVF/BCLD  - Pain and Nausea control  - SCDs for DVT ppx.  - IS/OOBA  - SQH POD#1 if Hgb stable  - Nutrition consult in AM  - AM labs  - Holding Losartan/HCTZ

## 2021-03-10 ENCOUNTER — TRANSCRIPTION ENCOUNTER (OUTPATIENT)
Age: 56
End: 2021-03-10

## 2021-03-10 LAB
ANION GAP SERPL CALC-SCNC: 7 MMOL/L — SIGNIFICANT CHANGE UP (ref 5–17)
ANION GAP SERPL CALC-SCNC: 8 MMOL/L — SIGNIFICANT CHANGE UP (ref 5–17)
ANISOCYTOSIS BLD QL: SLIGHT — SIGNIFICANT CHANGE UP
BASOPHILS NFR BLD AUTO: 2 % — SIGNIFICANT CHANGE UP (ref 0–2)
BUN SERPL-MCNC: 23 MG/DL — SIGNIFICANT CHANGE UP (ref 7–23)
BUN SERPL-MCNC: 24 MG/DL — HIGH (ref 7–23)
CALCIUM SERPL-MCNC: 8.5 MG/DL — SIGNIFICANT CHANGE UP (ref 8.4–10.5)
CALCIUM SERPL-MCNC: 8.5 MG/DL — SIGNIFICANT CHANGE UP (ref 8.4–10.5)
CHLORIDE SERPL-SCNC: 103 MMOL/L — SIGNIFICANT CHANGE UP (ref 96–108)
CHLORIDE SERPL-SCNC: 106 MMOL/L — SIGNIFICANT CHANGE UP (ref 96–108)
CO2 SERPL-SCNC: 26 MMOL/L — SIGNIFICANT CHANGE UP (ref 22–31)
CO2 SERPL-SCNC: 27 MMOL/L — SIGNIFICANT CHANGE UP (ref 22–31)
CREAT SERPL-MCNC: 1.16 MG/DL — SIGNIFICANT CHANGE UP (ref 0.5–1.3)
CREAT SERPL-MCNC: 1.42 MG/DL — HIGH (ref 0.5–1.3)
GLUCOSE SERPL-MCNC: 76 MG/DL — SIGNIFICANT CHANGE UP (ref 70–99)
GLUCOSE SERPL-MCNC: 85 MG/DL — SIGNIFICANT CHANGE UP (ref 70–99)
HCT VFR BLD CALC: 35.6 % — SIGNIFICANT CHANGE UP (ref 34.5–45)
HGB BLD-MCNC: 11.3 G/DL — LOW (ref 11.5–15.5)
HYPOCHROMIA BLD QL: SLIGHT — SIGNIFICANT CHANGE UP
LG PLATELETS BLD QL AUTO: PRESENT — SIGNIFICANT CHANGE UP
LYMPHOCYTES # BLD AUTO: 40 % — SIGNIFICANT CHANGE UP (ref 13–44)
MACROCYTES BLD QL: SLIGHT — SIGNIFICANT CHANGE UP
MAGNESIUM SERPL-MCNC: 2.1 MG/DL — SIGNIFICANT CHANGE UP (ref 1.6–2.6)
MANUAL SMEAR VERIFICATION: SIGNIFICANT CHANGE UP
MCHC RBC-ENTMCNC: 30 PG — SIGNIFICANT CHANGE UP (ref 27–34)
MCHC RBC-ENTMCNC: 31.7 GM/DL — LOW (ref 32–36)
MCV RBC AUTO: 94.4 FL — SIGNIFICANT CHANGE UP (ref 80–100)
MONOCYTES NFR BLD AUTO: 2 % — SIGNIFICANT CHANGE UP (ref 2–14)
NEUTROPHILS NFR BLD AUTO: 56 % — SIGNIFICANT CHANGE UP (ref 43–77)
NEUTS VAC BLD QL SMEAR: PRESENT — SIGNIFICANT CHANGE UP
NRBC # BLD: 0 /100 WBCS — SIGNIFICANT CHANGE UP (ref 0–0)
OVALOCYTES BLD QL SMEAR: SLIGHT — SIGNIFICANT CHANGE UP
PHOSPHATE SERPL-MCNC: 2.9 MG/DL — SIGNIFICANT CHANGE UP (ref 2.5–4.5)
PLAT MORPH BLD: ABNORMAL
PLATELET # BLD AUTO: 184 K/UL — SIGNIFICANT CHANGE UP (ref 150–400)
POIKILOCYTOSIS BLD QL AUTO: SLIGHT — SIGNIFICANT CHANGE UP
POTASSIUM SERPL-MCNC: 4 MMOL/L — SIGNIFICANT CHANGE UP (ref 3.5–5.3)
POTASSIUM SERPL-MCNC: 4.5 MMOL/L — SIGNIFICANT CHANGE UP (ref 3.5–5.3)
POTASSIUM SERPL-SCNC: 4 MMOL/L — SIGNIFICANT CHANGE UP (ref 3.5–5.3)
POTASSIUM SERPL-SCNC: 4.5 MMOL/L — SIGNIFICANT CHANGE UP (ref 3.5–5.3)
RBC # BLD: 3.77 M/UL — LOW (ref 3.8–5.2)
RBC # FLD: 12.2 % — SIGNIFICANT CHANGE UP (ref 10.3–14.5)
RBC BLD AUTO: ABNORMAL
SODIUM SERPL-SCNC: 137 MMOL/L — SIGNIFICANT CHANGE UP (ref 135–145)
SODIUM SERPL-SCNC: 140 MMOL/L — SIGNIFICANT CHANGE UP (ref 135–145)
SURGICAL PATHOLOGY STUDY: SIGNIFICANT CHANGE UP
WBC # BLD: 7.76 K/UL — SIGNIFICANT CHANGE UP (ref 3.8–10.5)
WBC # FLD AUTO: 7.76 K/UL — SIGNIFICANT CHANGE UP (ref 3.8–10.5)

## 2021-03-10 PROCEDURE — 99232 SBSQ HOSP IP/OBS MODERATE 35: CPT

## 2021-03-10 RX ORDER — SODIUM CHLORIDE 9 MG/ML
1000 INJECTION, SOLUTION INTRAVENOUS
Refills: 0 | Status: DISCONTINUED | OUTPATIENT
Start: 2021-03-10 | End: 2021-03-11

## 2021-03-10 RX ORDER — OXYCODONE HYDROCHLORIDE 5 MG/1
5 TABLET ORAL ONCE
Refills: 0 | Status: DISCONTINUED | OUTPATIENT
Start: 2021-03-10 | End: 2021-03-10

## 2021-03-10 RX ORDER — ACETAMINOPHEN 500 MG
1000 TABLET ORAL ONCE
Refills: 0 | Status: DISCONTINUED | OUTPATIENT
Start: 2021-03-10 | End: 2021-03-11

## 2021-03-10 RX ADMIN — OXYCODONE HYDROCHLORIDE 5 MILLIGRAM(S): 5 TABLET ORAL at 11:26

## 2021-03-10 RX ADMIN — HEPARIN SODIUM 7500 UNIT(S): 5000 INJECTION INTRAVENOUS; SUBCUTANEOUS at 22:14

## 2021-03-10 RX ADMIN — GABAPENTIN 600 MILLIGRAM(S): 400 CAPSULE ORAL at 17:36

## 2021-03-10 RX ADMIN — HEPARIN SODIUM 7500 UNIT(S): 5000 INJECTION INTRAVENOUS; SUBCUTANEOUS at 05:36

## 2021-03-10 RX ADMIN — GABAPENTIN 600 MILLIGRAM(S): 400 CAPSULE ORAL at 05:36

## 2021-03-10 RX ADMIN — PANTOPRAZOLE SODIUM 40 MILLIGRAM(S): 20 TABLET, DELAYED RELEASE ORAL at 11:24

## 2021-03-10 RX ADMIN — SODIUM CHLORIDE 200 MILLILITER(S): 9 INJECTION, SOLUTION INTRAVENOUS at 05:36

## 2021-03-10 RX ADMIN — OXYCODONE HYDROCHLORIDE 5 MILLIGRAM(S): 5 TABLET ORAL at 12:26

## 2021-03-10 RX ADMIN — ESCITALOPRAM OXALATE 20 MILLIGRAM(S): 10 TABLET, FILM COATED ORAL at 11:29

## 2021-03-10 RX ADMIN — ZOLPIDEM TARTRATE 5 MILLIGRAM(S): 10 TABLET ORAL at 22:15

## 2021-03-10 RX ADMIN — Medication 81 MILLIGRAM(S): at 11:26

## 2021-03-10 RX ADMIN — HEPARIN SODIUM 7500 UNIT(S): 5000 INJECTION INTRAVENOUS; SUBCUTANEOUS at 15:11

## 2021-03-10 RX ADMIN — Medication 150 MILLIGRAM(S): at 11:26

## 2021-03-10 RX ADMIN — ZOLPIDEM TARTRATE 5 MILLIGRAM(S): 10 TABLET ORAL at 23:41

## 2021-03-10 NOTE — DISCHARGE NOTE PROVIDER - HOSPITAL COURSE
56 yo F w/ PMH of HTN, MO ( BMI 44)  diverticulosis, gallstones, anxiety and PSH of partial hysterectomy s/p Robotic-assisted sleeve gastrectomy (3/8). Pt tolerated the procedure well.  Patient creatinine  increased to 1.51 from 1.18 . Nephrology consulted and recommended for patient to follow up with nephrologist outpatient. At time of discharge, pt was tolerating a bariatric clear liquid diet, and pt's pain was controlled. Plan is to follow up with Dr. Barker in the office.     54 yo F w/ PMH of HTN, MO ( BMI 44)  diverticulosis, gallstones, anxiety and PSH of partial hysterectomy s/p Robotic-assisted sleeve gastrectomy (3/8). Pt tolerated the procedure well.  Patient creatinine  increased to 1.51 from 1.18 . Nephrology consulted and recommended for patient to follow up with primary care doctor. At the time of discharge patients Creatinine  normalized to 1.05.  At time of discharge, pt was tolerating a bariatric clear liquid diet, and pt's pain was controlled. Plan is to follow up with Dr. Barker in the office.

## 2021-03-10 NOTE — DISCHARGE NOTE PROVIDER - NSDCCPGOAL_GEN_ALL_CORE_FT
To get better and follow your care plan as instructed.  1) Please take Tylenol 650 mg every 4 to 6 hours by mouth for moderate pain control. Please do not exceed over 4,000 mg of Tylenol a day.  2) Please start taking Eliquis 2.5 mg by mouth twice a day starting Friday March 12, 2021  3 days after surgery .  3) Please take Omeprazole 40 mg once a day by mouth.

## 2021-03-10 NOTE — PROGRESS NOTE ADULT - ASSESSMENT
54 yo F w/ PMH of HTN, diverticulosis, gallstones, anxiety and PSH of partial hysterectomy presents for elective robotic-assisted sleeve gastrectomy. Nephrology consulted for NI. Pre-operative sCr 1.16 on 2/4. Patient underwent sleeve gastrectomy on 3/8 w/o complications, review of OR flowsheets w/o evidence of hypotensive episodes. Patient currently with appropriate UOP and currently on standing LR at 200cc/hr. No recent contrast administration. Medication review showing that patient has received toradol 15mg x3. sCr slightly improved from 3/8 downtrended 1.51 to 1.48.     #NI on CKDII - patient with sCr of 1.5 baseline 1.0 and GFR ~70 . Etiology likely 2/2 NSAID use causing vasoconstriction of . Patient currently urinating no suprapubic fullness low suspicion for retention at this time. No documented episodes of hypotension that would suggest ischemic ATN. sCr now markedly improved since discontinuation of toradol afternoon sCr 1.16  - please dc toradol, can try tylenol for mild pain and oxycodone or morphine for moderate to severe pain   - avoid nephrotoxic meds  - trend sCr  - strict I/O  - please reduce rate of IV LR to 100cc/hr while patient's appetite is still poor   54 yo F w/ PMH of HTN, diverticulosis, gallstones, anxiety and PSH of partial hysterectomy presents for elective robotic-assisted sleeve gastrectomy. Nephrology consulted for NI. Pre-operative sCr 1.16 on 2/4. Patient underwent sleeve gastrectomy on 3/8 w/o complications, review of OR flowsheets w/o evidence of hypotensive episodes. Patient currently with appropriate UOP and currently on standing LR at 200cc/hr. No recent contrast administration. Medication review showing that patient has received toradol 15mg x3. sCr slightly improved from 3/8 downtrended 1.51 to 1.48.     #NI on CKDII - patient with sCr of 1.5 baseline 1.0 and GFR ~70 . Etiology likely 2/2 NSAID use. Patient currently urinating no suprapubic fullness low suspicion for retention at this time. No documented episodes of hypotension that would suggest ischemic ATN. sCr now markedly improved since discontinuation of toradol afternoon sCr 1.16  - please dc toradol, can try tylenol for mild pain and oxycodone or morphine for moderate to severe pain   - avoid nephrotoxic meds  - trend sCr  - strict I/O  - please reduce rate of IV LR to 100cc/hr while patient's appetite is still poor    Plan discussed with Dr. Calvin

## 2021-03-10 NOTE — DISCHARGE NOTE PROVIDER - NSDCCPCAREPLAN_GEN_ALL_CORE_FT
PRINCIPAL DISCHARGE DIAGNOSIS  Diagnosis: Morbid obesity  Assessment and Plan of Treatment: 56 yo F w/ PMH of HTN, MO ( BMI 44)  diverticulosis, gallstones, anxiety and PSH of partial hysterectomy s/p Robotic-assisted sleeve gastrectomy (3/8). Pt tolerated the procedure well.  Patient creatinine  increased to 1.51 from 1.18 . Nephrology consulted and recommended for patient to follow up with nephrologist outpatient. At time of discharge, pt was tolerating a bariatric clear liquid diet, and pt's pain was controlled. Plan is to follow up with Dr. Barker in the office.  1) Please take Tylenol 650 mg every 4 to 6 hours by mouth for moderate pain control. Please do not exceed over 4,000 mg of Tylenol a day.  2) Please start taking Eliquis 2.5 mg by mouth twice a day starting Friday March 12, 2021  3 days after surgery .  3) Please take Omeprazole 40 mg once a day by mouth.

## 2021-03-10 NOTE — PROGRESS NOTE ADULT - ATTENDING COMMENTS
NI likely hemodynamic in the setting of gastric surgery and NSAIDs inhibiting renal afferent arteriolar autoregulation. Decrease IVF rate to at least <100ml/hr or below. Not tolerating PO liquids well so would not dc IVF at this time. Renal fx improving, non oliguric. Needs more than tylenol as her pain is uncontrolled, avoid NSAIDs

## 2021-03-10 NOTE — DISCHARGE NOTE PROVIDER - CARE PROVIDER_API CALL
Simeon Barker (MD)  Surgery  [address]  [place], [place] [address]  Phone: [phone]  Fax: [phone]  Follow Up Time: 1 week

## 2021-03-10 NOTE — DISCHARGE NOTE PROVIDER - NSDCFUADDINST_GEN_ALL_CORE_FT
Follow up with  in 1 week. Call the office at  to schedule your appointment.  You may shower; soap and water over incision sites. Do not scrub. Pat dry when done. No tub bathing or swimming until cleared. Keep incision sites out of the sun as scars will darken. No heavy lifting (>10lbs) or strenuous exercise. Diet: Bariatric Full Fluids. 60 grams protein daily.  64 fluid ounces water daily. Drink small sips throughout the day. Continue diet as outlined by paperwork received as a pre-operative patient. You should be urinating at least 3-4x per day. Call the office if you experience increasing abdominal pain, nausea, vomiting, or temperature >100.4F.  NO ASPIRIN OR NSAIDs until approved by Dr. Barker. Avoid alcoholic beverages until cleared by Dr. Barker.  1) Please take Tylenol 650 mg every 4 to 6 hours by mouth for moderate pain control. Please do not exceed over 4,000 mg of Tylenol a day.  2) Please start taking Eliquis 2.5 mg by mouth twice a day starting Friday March 12, 2021  3 days after surgery .  3) Please take Omeprazole 40 mg once a day by mouth.

## 2021-03-10 NOTE — DISCHARGE NOTE PROVIDER - NSDCMRMEDTOKEN_GEN_ALL_CORE_FT
aspirin 81 mg oral tablet, chewable: 1 tab(s) orally once a day  gabapentin 600 mg oral tablet: orally 2 times a day  Lexapro 20 mg oral tablet: 1 tab(s) orally once a day  losartan-hydrochlorothiazide 100mg-25mg oral tablet: 1 tab(s) orally once a day  nicotine 5 mg/16 hr transdermal film, extended release: transdermal once a day  traZODone 150 mg oral tablet: orally prn  zolpidem 10 mg oral tablet: 1 tab(s) orally once a day (at bedtime)   aspirin 81 mg oral tablet, chewable: 1 tab(s) orally once a day  Eliquis 2.5 mg oral tablet: 1 tab(s) orally 2 times a day MDD:2 tablets  gabapentin 600 mg oral tablet: orally 2 times a day  Lexapro 20 mg oral tablet: 1 tab(s) orally once a day  nicotine 5 mg/16 hr transdermal film, extended release: transdermal once a day  omeprazole 40 mg oral delayed release capsule: 1 cap(s) orally once a day MDD:1 capsule  traZODone 150 mg oral tablet: orally prn  Tylenol Regular Strength 325 mg oral tablet: 2 tab(s) orally every 6 hours, As Needed -for moderate pain - for severe pain MDD:8 tablets  zolpidem 10 mg oral tablet: 1 tab(s) orally once a day (at bedtime)

## 2021-03-10 NOTE — PROGRESS NOTE ADULT - ASSESSMENT
56 yo F w/ PMH of HTN, diverticulosis, gallstones, anxiety and PSH of partial hysterectomy presents for elective robotic-assisted sleeve gastrectomy. Now s/p Robotic-assisted sleeve gastrectomy (3/8).    - IVF/BCLD  - Pain and Nausea control  - SCDs for DVT ppx.  - IS/OOBA  - HSQ DVT PPX   - AM labs  -F/U Nephro

## 2021-03-11 ENCOUNTER — TRANSCRIPTION ENCOUNTER (OUTPATIENT)
Age: 56
End: 2021-03-11

## 2021-03-11 VITALS
RESPIRATION RATE: 17 BRPM | DIASTOLIC BLOOD PRESSURE: 74 MMHG | OXYGEN SATURATION: 96 % | TEMPERATURE: 99 F | SYSTOLIC BLOOD PRESSURE: 114 MMHG | HEART RATE: 63 BPM

## 2021-03-11 LAB
ANION GAP SERPL CALC-SCNC: 9 MMOL/L — SIGNIFICANT CHANGE UP (ref 5–17)
BUN SERPL-MCNC: 19 MG/DL — SIGNIFICANT CHANGE UP (ref 7–23)
CALCIUM SERPL-MCNC: 8.6 MG/DL — SIGNIFICANT CHANGE UP (ref 8.4–10.5)
CHLORIDE SERPL-SCNC: 104 MMOL/L — SIGNIFICANT CHANGE UP (ref 96–108)
CO2 SERPL-SCNC: 24 MMOL/L — SIGNIFICANT CHANGE UP (ref 22–31)
CREAT SERPL-MCNC: 1.05 MG/DL — SIGNIFICANT CHANGE UP (ref 0.5–1.3)
GLUCOSE SERPL-MCNC: 70 MG/DL — SIGNIFICANT CHANGE UP (ref 70–99)
HCT VFR BLD CALC: 36.2 % — SIGNIFICANT CHANGE UP (ref 34.5–45)
HGB BLD-MCNC: 11.6 G/DL — SIGNIFICANT CHANGE UP (ref 11.5–15.5)
MAGNESIUM SERPL-MCNC: 1.8 MG/DL — SIGNIFICANT CHANGE UP (ref 1.6–2.6)
MCHC RBC-ENTMCNC: 29.4 PG — SIGNIFICANT CHANGE UP (ref 27–34)
MCHC RBC-ENTMCNC: 32 GM/DL — SIGNIFICANT CHANGE UP (ref 32–36)
MCV RBC AUTO: 91.9 FL — SIGNIFICANT CHANGE UP (ref 80–100)
NRBC # BLD: 0 /100 WBCS — SIGNIFICANT CHANGE UP (ref 0–0)
PHOSPHATE SERPL-MCNC: 2.9 MG/DL — SIGNIFICANT CHANGE UP (ref 2.5–4.5)
PLATELET # BLD AUTO: 179 K/UL — SIGNIFICANT CHANGE UP (ref 150–400)
POTASSIUM SERPL-MCNC: 4 MMOL/L — SIGNIFICANT CHANGE UP (ref 3.5–5.3)
POTASSIUM SERPL-SCNC: 4 MMOL/L — SIGNIFICANT CHANGE UP (ref 3.5–5.3)
RBC # BLD: 3.94 M/UL — SIGNIFICANT CHANGE UP (ref 3.8–5.2)
RBC # FLD: 12 % — SIGNIFICANT CHANGE UP (ref 10.3–14.5)
SODIUM SERPL-SCNC: 137 MMOL/L — SIGNIFICANT CHANGE UP (ref 135–145)
WBC # BLD: 6.75 K/UL — SIGNIFICANT CHANGE UP (ref 3.8–10.5)
WBC # FLD AUTO: 6.75 K/UL — SIGNIFICANT CHANGE UP (ref 3.8–10.5)

## 2021-03-11 PROCEDURE — 84300 ASSAY OF URINE SODIUM: CPT

## 2021-03-11 PROCEDURE — 86803 HEPATITIS C AB TEST: CPT

## 2021-03-11 PROCEDURE — 36415 COLL VENOUS BLD VENIPUNCTURE: CPT

## 2021-03-11 PROCEDURE — 86850 RBC ANTIBODY SCREEN: CPT

## 2021-03-11 PROCEDURE — 80048 BASIC METABOLIC PNL TOTAL CA: CPT

## 2021-03-11 PROCEDURE — 85027 COMPLETE CBC AUTOMATED: CPT

## 2021-03-11 PROCEDURE — 84133 ASSAY OF URINE POTASSIUM: CPT

## 2021-03-11 PROCEDURE — 86900 BLOOD TYPING SEROLOGIC ABO: CPT

## 2021-03-11 PROCEDURE — 86901 BLOOD TYPING SEROLOGIC RH(D): CPT

## 2021-03-11 PROCEDURE — 83935 ASSAY OF URINE OSMOLALITY: CPT

## 2021-03-11 PROCEDURE — 83735 ASSAY OF MAGNESIUM: CPT

## 2021-03-11 PROCEDURE — C1889: CPT

## 2021-03-11 PROCEDURE — S2900: CPT

## 2021-03-11 PROCEDURE — 84100 ASSAY OF PHOSPHORUS: CPT

## 2021-03-11 PROCEDURE — 85007 BL SMEAR W/DIFF WBC COUNT: CPT

## 2021-03-11 PROCEDURE — 88307 TISSUE EXAM BY PATHOLOGIST: CPT

## 2021-03-11 PROCEDURE — 81001 URINALYSIS AUTO W/SCOPE: CPT

## 2021-03-11 RX ORDER — OMEPRAZOLE 10 MG/1
1 CAPSULE, DELAYED RELEASE ORAL
Qty: 30 | Refills: 0
Start: 2021-03-11 | End: 2021-04-09

## 2021-03-11 RX ORDER — MAGNESIUM SULFATE 500 MG/ML
1 VIAL (ML) INJECTION ONCE
Refills: 0 | Status: COMPLETED | OUTPATIENT
Start: 2021-03-11 | End: 2021-03-11

## 2021-03-11 RX ORDER — APIXABAN 2.5 MG/1
1 TABLET, FILM COATED ORAL
Qty: 60 | Refills: 0
Start: 2021-03-11 | End: 2021-04-09

## 2021-03-11 RX ORDER — ACETAMINOPHEN 500 MG
2 TABLET ORAL
Qty: 32 | Refills: 0
Start: 2021-03-11 | End: 2021-03-14

## 2021-03-11 RX ORDER — LOSARTAN/HYDROCHLOROTHIAZIDE 100MG-25MG
1 TABLET ORAL
Qty: 0 | Refills: 0 | DISCHARGE

## 2021-03-11 RX ADMIN — GABAPENTIN 600 MILLIGRAM(S): 400 CAPSULE ORAL at 05:38

## 2021-03-11 RX ADMIN — Medication 150 MILLIGRAM(S): at 11:25

## 2021-03-11 RX ADMIN — PANTOPRAZOLE SODIUM 40 MILLIGRAM(S): 20 TABLET, DELAYED RELEASE ORAL at 11:25

## 2021-03-11 RX ADMIN — ESCITALOPRAM OXALATE 20 MILLIGRAM(S): 10 TABLET, FILM COATED ORAL at 11:25

## 2021-03-11 RX ADMIN — HEPARIN SODIUM 7500 UNIT(S): 5000 INJECTION INTRAVENOUS; SUBCUTANEOUS at 05:38

## 2021-03-11 RX ADMIN — ONDANSETRON 4 MILLIGRAM(S): 8 TABLET, FILM COATED ORAL at 11:27

## 2021-03-11 RX ADMIN — Medication 100 GRAM(S): at 11:32

## 2021-03-11 RX ADMIN — Medication 81 MILLIGRAM(S): at 11:25

## 2021-03-11 NOTE — PROGRESS NOTE ADULT - ASSESSMENT
56 yo F w/ PMH of HTN, MO ( BMI 44)  diverticulosis, gallstones, anxiety and PSH of partial hysterectomy s/p Robotic-assisted sleeve gastrectomy (3/8).    - IVF/BCLD  - Pain and Nausea control  - SCDs for DVT ppx.  - IS/OOBA  - HSQ DVT PPX   - AM labs  - f/u nephro final recs  -Discharge home today

## 2021-03-11 NOTE — PROGRESS NOTE ADULT - REASON FOR ADMISSION
Elective surgery

## 2021-03-11 NOTE — PROGRESS NOTE ADULT - ASSESSMENT
56 yo F w/ PMH of HTN, MO ( BMI 44)  diverticulosis, gallstones, anxiety and PSH of partial hysterectomy s/p Robotic-assisted sleeve gastrectomy (3/8).    - IVF/BCLD  - Pain and Nausea control  - SCDs/SQH for DVT ppx.  - IS/OOBA  - AM labs

## 2021-03-11 NOTE — PROGRESS NOTE ADULT - PROVIDER SPECIALTY LIST ADULT
Bariatric Surgery
Nephrology

## 2021-03-11 NOTE — DISCHARGE NOTE NURSING/CASE MANAGEMENT/SOCIAL WORK - PATIENT PORTAL LINK FT
You can access the FollowMyHealth Patient Portal offered by [facility] by registering at the following website: [email or web address]. By joining [facility]’s FollowMyHealth portal, you will also be able to view your health information using other applications (apps) compatible with our system.

## 2021-03-11 NOTE — PROGRESS NOTE ADULT - SUBJECTIVE AND OBJECTIVE BOX
INTERVAL HPI/OVERNIGHT EVENTS: IV Tylenol for pain, encouraged pt to ambulate and use IS, making good urine, urine lytes obtained, UA small leukoesterase, VSS    3/9: CR 1.51 , uo 650 since coming out of the OR, IVF fluids increased to 200  ml and bolus of 500 cc given , NEPHRO consulted recommends discontinue Toradol , urine lytes , UA , reports patient prior to surgery has CKD stage 2        STATUS POST:  Laparoscopic sleeve gastrectomy     POST OPERATIVE DAY #: 2    SUBJECTIVE: Patient examined bedside with chief resident. Patient reports that she is drinking 2 oz/hr and her pain is well controlled . Patient reports she is ambulating and using incentive spirometer. Patient denies SOB, chest pain, nausea and emesis. Patient making adequate urine output.      aspirin enteric coated 81 milliGRAM(s) Oral daily  heparin   Injectable 7500 Unit(s) SubCutaneous every 8 hours      Vital Signs Last 24 Hrs  T(C): 36.9 (10 Mar 2021 05:04), Max: 37.2 (09 Mar 2021 09:55)  T(F): 98.4 (10 Mar 2021 05:04), Max: 98.9 (09 Mar 2021 09:55)  HR: 71 (10 Mar 2021 05:04) (57 - 71)  BP: 124/85 (10 Mar 2021 05:04) (101/69 - 126/83)  BP(mean): --  RR: 18 (10 Mar 2021 05:04) (17 - 18)  SpO2: 96% (10 Mar 2021 05:04) (96% - 99%)  I&O's Detail    09 Mar 2021 07:01  -  10 Mar 2021 07:00  --------------------------------------------------------  IN:    Lactated Ringers: 1600 mL    Oral Fluid: 1550 mL  Total IN: 3150 mL    OUT:    Voided (mL): 2050 mL  Total OUT: 2050 mL    Total NET: 1100 mL          General: NAD, resting comfortably in bed  C/V: NSR  Pulm: Nonlabored breathing, no respiratory distress  Abd: Soft, non-distended, TTP around incision site, incision clean dry and intact  Extrem: WWP, no edema, SCDs in place        LABS:                        11.3   7.76  )-----------( 184      ( 10 Mar 2021 06:19 )             35.6     03-10    140  |  106  |  24<H>  ----------------------------<  85  4.5   |  27  |  1.42<H>    Ca    8.5      10 Mar 2021 06:19  Phos  2.9     03-10  Mg     2.1     03-10        Urinalysis Basic - ( 09 Mar 2021 20:36 )    Color: Yellow / Appearance: Clear / S.020 / pH: x  Gluc: x / Ketone: NEGATIVE  / Bili: Negative / Urobili: 0.2 E.U./dL   Blood: x / Protein: NEGATIVE mg/dL / Nitrite: NEGATIVE   Leuk Esterase: Small / RBC: < 5 /HPF / WBC > 10 /HPF   Sq Epi: x / Non Sq Epi: 5-10 /HPF / Bacteria: Present /HPF        
POST-OPERATIVE NOTE    Procedure: Robotic-assisted Sleeve Gastrectomy    Diagnosis/Indication: Morbid Obesity    Surgeon: Dr. Lucero ANDRADE: The patient tolerated the procedure well, her POC was WNL. The patient was examined at the bedside. She does not have any complaints other than pain at the site of surgery. The patient denies having chest pain, SOB, dizziness, chills, nausea, vomiting.    O:  T(C): 36.2 (03-08-21 @ 15:21), Max: 36.2 (03-08-21 @ 14:06)  T(F): 97.1 (03-08-21 @ 15:21), Max: 97.2 (03-08-21 @ 14:06)  HR: 79 (03-08-21 @ 15:51) (74 - 83)  BP: 124/71 (03-08-21 @ 15:51) (113/63 - 133/66)  RR: 19 (03-08-21 @ 15:51) (16 - 19)  SpO2: 100% (03-08-21 @ 15:51) (92% - 100%)  Wt(kg): --            Gen: NAD, resting comfortably in bed  C/V: NSR, S1, S2 present  Pulm: Nonlabored breathing, no respiratory distress  Abd: soft, ND, appropriately TTP @ the incision sites, no rebound tenderness, guarding, rigidity, incisions C/D/I  Extrem: WWP, no calf edema or tenderness, SCDs in place  Neuro: AAOx3      A/P: 56 yo F w/ PMH of HTN, diverticulosis, gallstones, anxiety and PSH of partial hysterectomy presents for elective robotic-assisted sleeve gastrectomy. Now s/p Robotic-assisted sleeve gastrectomy (3/8).    - IVF/BCLD  - Pain and Nausea control  - SCDs for DVT ppx.  - IS/OOBA  - CBC 6 hours post-op  - SQH POD#1 if Hgb stable  - Nutrition consult in AM  - AM labs  - Holding Losartan/HCTZ  
INTERVAL HPI/OVERNIGHT EVENTS: pain controlled, very sleepy since arriving on 9uris, post op h/h: 13.4/42.9, passed TOV (200cc), pt did not ambulate, VSS  3/8: RA sleeve gastrectomy, POC WNL      STATUS POST:  Robotic assisted sleeve gastrectomy     POST OPERATIVE DAY #: 1    SUBJECTIVE: Patient examined bedside with chief resident. Patient reports that she is tolerating diet and her pain is well controlled. Patient reports she is ambulating and using incentive spirometer. Patient denies SOB, chest pain, nausea and emesis. Patient making adequate urine output.    aspirin enteric coated 81 milliGRAM(s) Oral daily      Vital Signs Last 24 Hrs  T(C): 36.8 (09 Mar 2021 05:22), Max: 37.3 (08 Mar 2021 17:15)  T(F): 98.3 (09 Mar 2021 05:22), Max: 99.1 (08 Mar 2021 17:15)  HR: 78 (09 Mar 2021 05:22) (64 - 83)  BP: 119/78 (09 Mar 2021 05:22) (106/71 - 133/66)  BP(mean): 93 (08 Mar 2021 15:51) (82 - 96)  RR: 18 (09 Mar 2021 05:22) (16 - 19)  SpO2: 95% (09 Mar 2021 05:22) (92% - 100%)  I&O's Detail    08 Mar 2021 07:01  -  09 Mar 2021 07:00  --------------------------------------------------------  IN:    Lactated Ringers: 2475 mL    Oral Fluid: 760 mL    Other (mL): 1000 mL  Total IN: 4235 mL    OUT:    Voided (mL): 650 mL  Total OUT: 650 mL    Total NET: 3585 mL          General: NAD, resting comfortably in bed  C/V: NSR  Pulm: Nonlabored breathing, no respiratory distress  Abd: Soft, non-distended, TTP around incision site, incision clean dry and intact.  Extrem: WWP, no edema, SCDs in place        LABS:                        11.5   9.35  )-----------( 200      ( 09 Mar 2021 06:15 )             35.5     03-09    136  |  103  |  27<H>  ----------------------------<  117<H>  4.4   |  24  |  1.51<H>    Ca    8.4      09 Mar 2021 06:15  Phos  4.2     03-09  Mg     1.7     03-09            
INTERVAL HPI/OVERNIGHT EVENTS: shaina bcld a little more, adequate urine, VSS   310: AM creating 1.4, repeat at noon 1.16, minimal PO intake      STATUS POST:  Robotic-assisted sleeve gastrectomy    POST OPERATIVE DAY #: 3    SUBJECTIVE: Patient examined bedside with chief resident. Patient reports that she is tolerating  bariatric clear liquid diet and her pain is well controlled. Patient reports she is ambulating and using incentive spirometer. Patient denies SOB, chest pain, nausea and emesis. Patient making adequate urine output      aspirin enteric coated 81 milliGRAM(s) Oral daily  heparin   Injectable 7500 Unit(s) SubCutaneous every 8 hours      Vital Signs Last 24 Hrs  T(C): 37.2 (11 Mar 2021 05:00), Max: 37.2 (10 Mar 2021 13:44)  T(F): 98.9 (11 Mar 2021 05:00), Max: 98.9 (10 Mar 2021 13:44)  HR: 75 (11 Mar 2021 05:00) (55 - 75)  BP: 121/75 (11 Mar 2021 05:00) (98/69 - 124/77)  BP(mean): 91 (11 Mar 2021 05:00) (88 - 91)  RR: 17 (11 Mar 2021 05:00) (16 - 18)  SpO2: 97% (11 Mar 2021 05:00) (95% - 100%)  I&O's Detail    09 Mar 2021 07:01  -  10 Mar 2021 07:00  --------------------------------------------------------  IN:    Lactated Ringers: 1600 mL    Lactated Ringers: 200 mL    Oral Fluid: 1550 mL  Total IN: 3350 mL    OUT:    Voided (mL): 2050 mL  Total OUT: 2050 mL    Total NET: 1300 mL      10 Mar 2021 07:01  -  11 Mar 2021 06:33  --------------------------------------------------------  IN:    Lactated Ringers: 525 mL    Lactated Ringers: 600 mL    Oral Fluid: 610 mL  Total IN: 1735 mL    OUT:    Voided (mL): 2000 mL  Total OUT: 2000 mL    Total NET: -265 mL          General: NAD, resting comfortably in bed  C/V: NSR  Pulm: Nonlabored breathing, no respiratory distress  Abd: Soft, non-distended, TTP around incision site, incision clean dry and intac  Extrem: WWP, no edema, SCDs in place      LABS:                        11.3   7.76  )-----------( 184      ( 10 Mar 2021 06:19 )             35.6     03-10    137  |  103  |  23  ----------------------------<  76  4.0   |  26  |  1.16    Ca    8.5      10 Mar 2021 12:08  Phos  2.9     03-10  Mg     2.1     03-10        Urinalysis Basic - ( 09 Mar 2021 20:36 )    Color: Yellow / Appearance: Clear / S.020 / pH: x  Gluc: x / Ketone: NEGATIVE  / Bili: Negative / Urobili: 0.2 E.U./dL   Blood: x / Protein: NEGATIVE mg/dL / Nitrite: NEGATIVE   Leuk Esterase: Small / RBC: < 5 /HPF / WBC > 10 /HPF   Sq Epi: x / Non Sq Epi: 5-10 /HPF / Bacteria: Present /HPF          
JESENIA BHATT 55y Female    Interval HPI:      Patient seen and examined at bedside:    T(C): 36.9 (03-10-21 @ 10:06), Max: 37.2 (21 @ 20:18)  HR: 69 (03-10-21 @ 10:06) (60 - 71)  BP: 124/77 (03-10-21 @ 10:06) (102/67 - 126/83)  RR: 16 (03-10-21 @ 10:06) (16 - 18)  SpO2: 100% (03-10-21 @ 10:06) (96% - 100%)    Review of systems negative except as mentioned above    acetaminophen   Tablet .. 650 milliGRAM(s) Oral every 6 hours PRN  acetaminophen  IVPB .. 1000 milliGRAM(s) IV Intermittent once  aspirin enteric coated 81 milliGRAM(s) Oral daily  escitalopram 20 milliGRAM(s) Oral daily  gabapentin 600 milliGRAM(s) Oral two times a day  heparin   Injectable 7500 Unit(s) SubCutaneous every 8 hours  influenza   Vaccine 0.5 milliLiter(s) IntraMuscular once  lactated ringers. 1000 milliLiter(s) IV Continuous <Continuous>  nicotine -   7 mG/24Hr(s) Patch 1 patch Transdermal daily  ondansetron Injectable 4 milliGRAM(s) IV Push every 6 hours PRN  pantoprazole  Injectable 40 milliGRAM(s) IV Push daily  traZODone 150 milliGRAM(s) Oral daily  zolpidem 5 milliGRAM(s) Oral at bedtime PRN  zolpidem 5 milliGRAM(s) Oral at bedtime PRN      Physical Exam:    Constitutional: Middle aged  female, resting comfortably in bed, NAD  HEENT: NC/AT; PERRL, anicteric sclera; no oropharyngeal erythema or exudates; MMM  Neck: supple, no appreciable JVD  Respiratory: CTA B/L, no W/R/R; respirations appear non-labored, conversive in full sentences  Cardiovascular: +S1/S2, RRR  Gastrointestinal: abdomen is obese soft, NT/ND  Extremities: WWP; no clubbing, cyanosis or edema  Vascular: 2+ radial, femoral, and DP/PT pulses B/L  Dermatologic: skin normal color and turgor; no visible rashes  Neurological: aox3    Labs:                        11.3   7.76  )-----------( 184      ( 10 Mar 2021 06:19 )             35.6     03-10    137  |  103  |  23  ----------------------------<  76  4.0   |  26  |  1.16    Ca    8.5      10 Mar 2021 12:08  Phos  2.9     03-10  Mg     2.1     03-10        Urinalysis Basic - ( 09 Mar 2021 20:36 )    Color: Yellow / Appearance: Clear / S.020 / pH: x  Gluc: x / Ketone: NEGATIVE  / Bili: Negative / Urobili: 0.2 E.U./dL   Blood: x / Protein: NEGATIVE mg/dL / Nitrite: NEGATIVE   Leuk Esterase: Small / RBC: < 5 /HPF / WBC > 10 /HPF   Sq Epi: x / Non Sq Epi: 5-10 /HPF / Bacteria: Present /HPF        CAPILLARY BLOOD GLUCOSE                Imaging:  
POD: 3  Procedure: Robotic-assisted sleeve gastrectomy    SUBJECTIVE: Overnight there were no significant events, this morning the patient is feeling well. She is tolerating BCLD w/o nausea or vomiting. The patient was examined at the bedside by the chief resident. She denies having chest pain, SOB, dizziness, chills.      Vital Signs Last 24 Hrs  T(C): 37.2 (11 Mar 2021 05:00), Max: 37.2 (10 Mar 2021 13:44)  T(F): 98.9 (11 Mar 2021 05:00), Max: 98.9 (10 Mar 2021 13:44)  HR: 75 (11 Mar 2021 05:00) (55 - 75)  BP: 121/75 (11 Mar 2021 05:00) (98/69 - 124/77)  BP(mean): 91 (11 Mar 2021 05:00) (88 - 91)  RR: 17 (11 Mar 2021 05:00) (16 - 18)  SpO2: 97% (11 Mar 2021 05:00) (95% - 100%)    Physical Exam:  General: NAD, resting comfortably in the bed  Pulmonary: Nonlabored breathing, no respiratory distress  Abdominal: soft, NT/ND, no rebound tenderness, guarding, rigidity, incisions C/D/I  Extremities: WWP, normal strength, no clubbing/cyanosis/edema  Neuro: AAOx3    Lines/drains/tubes:    I&O's Summary    10 Mar 2021 07:01  -  11 Mar 2021 07:00  --------------------------------------------------------  IN: 1735 mL / OUT: 2200 mL / NET: -465 mL        LABS:                        11.3   7.76  )-----------( 184      ( 10 Mar 2021 06:19 )             35.6     03-10    137  |  103  |  23  ----------------------------<  76  4.0   |  26  |  1.16    Ca    8.5      10 Mar 2021 12:08  Phos  2.9     03-10  Mg     2.1     03-10        Urinalysis Basic - ( 09 Mar 2021 20:36 )    Color: Yellow / Appearance: Clear / S.020 / pH: x  Gluc: x / Ketone: NEGATIVE  / Bili: Negative / Urobili: 0.2 E.U./dL   Blood: x / Protein: NEGATIVE mg/dL / Nitrite: NEGATIVE   Leuk Esterase: Small / RBC: < 5 /HPF / WBC > 10 /HPF   Sq Epi: x / Non Sq Epi: 5-10 /HPF / Bacteria: Present /HPF      CAPILLARY BLOOD GLUCOSE            RADIOLOGY & ADDITIONAL STUDIES:

## 2021-03-12 ENCOUNTER — NON-APPOINTMENT (OUTPATIENT)
Age: 56
End: 2021-03-12

## 2021-03-17 DIAGNOSIS — F41.9 ANXIETY DISORDER, UNSPECIFIED: ICD-10-CM

## 2021-03-17 DIAGNOSIS — N17.9 ACUTE KIDNEY FAILURE, UNSPECIFIED: ICD-10-CM

## 2021-03-17 DIAGNOSIS — I12.9 HYPERTENSIVE CHRONIC KIDNEY DISEASE WITH STAGE 1 THROUGH STAGE 4 CHRONIC KIDNEY DISEASE, OR UNSPECIFIED CHRONIC KIDNEY DISEASE: ICD-10-CM

## 2021-03-17 DIAGNOSIS — N18.2 CHRONIC KIDNEY DISEASE, STAGE 2 (MILD): ICD-10-CM

## 2021-03-17 DIAGNOSIS — E66.01 MORBID (SEVERE) OBESITY DUE TO EXCESS CALORIES: ICD-10-CM

## 2021-03-17 DIAGNOSIS — Z96.651 PRESENCE OF RIGHT ARTIFICIAL KNEE JOINT: ICD-10-CM

## 2021-03-17 DIAGNOSIS — M54.5 LOW BACK PAIN: ICD-10-CM

## 2021-03-17 DIAGNOSIS — Z79.82 LONG TERM (CURRENT) USE OF ASPIRIN: ICD-10-CM

## 2021-03-17 DIAGNOSIS — K80.20 CALCULUS OF GALLBLADDER WITHOUT CHOLECYSTITIS WITHOUT OBSTRUCTION: ICD-10-CM

## 2021-03-17 DIAGNOSIS — Z87.891 PERSONAL HISTORY OF NICOTINE DEPENDENCE: ICD-10-CM

## 2021-03-17 DIAGNOSIS — G89.29 OTHER CHRONIC PAIN: ICD-10-CM

## 2021-03-17 DIAGNOSIS — Z88.5 ALLERGY STATUS TO NARCOTIC AGENT: ICD-10-CM

## 2021-03-19 NOTE — HISTORY OF PRESENT ILLNESS
[de-identified] : Patient is scheduled for a laparoscopic sleeve gastrectomy. She has been cleared by her PCP. Her upper GI study shows no abnormalities. she has not lost any weight sine her consultation.

## 2021-03-24 ENCOUNTER — APPOINTMENT (OUTPATIENT)
Dept: BARIATRICS | Facility: CLINIC | Age: 56
End: 2021-03-24

## 2021-03-25 PROBLEM — T14.8XXA OTHER INJURY OF UNSPECIFIED BODY REGION, INITIAL ENCOUNTER: Chronic | Status: ACTIVE | Noted: 2021-03-05

## 2021-03-25 PROBLEM — M19.019 PRIMARY OSTEOARTHRITIS, UNSPECIFIED SHOULDER: Chronic | Status: ACTIVE | Noted: 2021-03-05

## 2021-03-25 PROBLEM — G47.00 INSOMNIA, UNSPECIFIED: Chronic | Status: ACTIVE | Noted: 2021-03-08

## 2021-03-25 PROBLEM — K57.90 DIVERTICULOSIS OF INTESTINE, PART UNSPECIFIED, WITHOUT PERFORATION OR ABSCESS WITHOUT BLEEDING: Chronic | Status: ACTIVE | Noted: 2021-03-08

## 2021-03-25 PROBLEM — E66.9 OBESITY, UNSPECIFIED: Chronic | Status: ACTIVE | Noted: 2021-03-05

## 2021-03-25 PROBLEM — F41.9 ANXIETY DISORDER, UNSPECIFIED: Chronic | Status: ACTIVE | Noted: 2021-03-08

## 2021-04-01 ENCOUNTER — APPOINTMENT (OUTPATIENT)
Dept: BARIATRICS | Facility: CLINIC | Age: 56
End: 2021-04-01
Payer: COMMERCIAL

## 2021-04-01 VITALS — HEIGHT: 65 IN | BODY MASS INDEX: 42.32 KG/M2 | WEIGHT: 254 LBS

## 2021-04-01 DIAGNOSIS — E66.01 MORBID (SEVERE) OBESITY DUE TO EXCESS CALORIES: ICD-10-CM

## 2021-04-01 PROCEDURE — 99024 POSTOP FOLLOW-UP VISIT: CPT

## 2021-04-01 NOTE — HISTORY OF PRESENT ILLNESS
[Home] : at home, [unfilled] , at the time of the visit. [Medical Office: ([facility])___] : at the medical office located in  [Verbal consent obtained from patient] : the patient, [unfilled] [de-identified] : Ms. Cross returns to see us today for a postoperative visit s/p sleeve gastrectomy on 3/8/21. Pt reports she is doing well, denies pain, n/v, heartburn or PO intolerances. Experiences some discomfort when she eats or drinks a little too much, learning to eat and drink small portions and listen to her body's cues. Tolerating stage III diet well, advancing as per guidelines. Exercising daily and taking daily vitamins. Completed course of eliquis and omeprazole. Discussed adding metamucil to help with bowel movements. Spent 10 minutes with patient discussing postoperative period.

## 2021-04-01 NOTE — PLAN
[FreeTextEntry1] : Discussed healthy eating habits and having small frequent meals every 3-4 hours\par Advance diet as per guidelines\par Add strength training at 6 weeks postop\par Follow up in 6 weeks

## 2021-04-01 NOTE — ASSESSMENT
[FreeTextEntry1] : 55 year old female one month s/p sleeve gastrectomy. Tolerating stage III diet well, having at least 55g of protein daily and advancing diet as per guidelines. She is exercising and taking daily vitamins.

## 2021-06-01 ENCOUNTER — APPOINTMENT (OUTPATIENT)
Dept: NEPHROLOGY | Facility: CLINIC | Age: 56
End: 2021-06-01
Payer: COMMERCIAL

## 2021-06-01 VITALS
WEIGHT: 230 LBS | DIASTOLIC BLOOD PRESSURE: 79 MMHG | SYSTOLIC BLOOD PRESSURE: 117 MMHG | HEART RATE: 76 BPM | BODY MASS INDEX: 38.27 KG/M2

## 2021-06-01 DIAGNOSIS — R10.9 UNSPECIFIED ABDOMINAL PAIN: ICD-10-CM

## 2021-06-01 DIAGNOSIS — N17.9 ACUTE KIDNEY FAILURE, UNSPECIFIED: ICD-10-CM

## 2021-06-01 PROCEDURE — 99243 OFF/OP CNSLTJ NEW/EST LOW 30: CPT

## 2021-06-01 RX ORDER — ESCITALOPRAM OXALATE 20 MG/1
20 TABLET ORAL
Qty: 30 | Refills: 0 | Status: ACTIVE | COMMUNITY
Start: 2021-05-27

## 2021-06-01 RX ORDER — TRAZODONE HYDROCHLORIDE 150 MG/1
150 TABLET ORAL
Qty: 30 | Refills: 5 | Status: ACTIVE | COMMUNITY
Start: 2021-06-01

## 2021-06-01 RX ORDER — CALCIUM CITRATE 1040MG
1040 TABLET ORAL DAILY
Refills: 0 | Status: ACTIVE | COMMUNITY
Start: 2021-06-01

## 2021-06-02 LAB
APPEARANCE: ABNORMAL
BACTERIA: NEGATIVE
BILIRUBIN URINE: ABNORMAL
BLOOD URINE: NEGATIVE
COLOR: YELLOW
CREAT SPEC-SCNC: 461 MG/DL
CREAT/PROT UR: 0.1 RATIO
GLUCOSE QUALITATIVE U: NEGATIVE
HYALINE CASTS: 0 /LPF
KETONES URINE: NORMAL
LEUKOCYTE ESTERASE URINE: NEGATIVE
MICROSCOPIC-UA: NORMAL
NITRITE URINE: NEGATIVE
PH URINE: 6
PROT UR-MCNC: 52 MG/DL
PROTEIN URINE: ABNORMAL
RED BLOOD CELLS URINE: 4 /HPF
SPECIFIC GRAVITY URINE: 1.03
SQUAMOUS EPITHELIAL CELLS: 14 /HPF
URINE COMMENTS: NORMAL
UROBILINOGEN URINE: ABNORMAL
WHITE BLOOD CELLS URINE: 5 /HPF

## 2021-06-03 NOTE — ASSESSMENT
[FreeTextEntry1] : s/p post operative NI that has resolved\par no HTN now off meds s/p weight loss with bariatric surgery -- advised cont monitor off meds \par will check UA , ur protein\par renal sono with left flank sx --likely musculosketal though \par if above ok - f/u here only if issue arises \par

## 2021-06-03 NOTE — CONSULT LETTER
[Dear  ___] : Dear  [unfilled], [Consult Letter:] : I had the pleasure of evaluating your patient, [unfilled]. [Please see my note below.] : Please see my note below. [Consult Closing:] : Thank you very much for allowing me to participate in the care of this patient.  If you have any questions, please do not hesitate to contact me. [Sincerely,] : Sincerely, [FreeTextEntry3] : Thad Gaitan MD, FLACO\par Division of Nephrology\par [facility]\par  of Medicine\par [employer] \par \par \par \par \par

## 2021-06-03 NOTE — HISTORY OF PRESENT ILLNESS
[FreeTextEntry1] : f/u post hospitalization at [facility]\par PCP Dr Wyatt Kay\par pt 56 yo B woman hx  obesity, HTN, reports s/p NI at [facility] March 2021 after bariatric surgery  (sleeve gastrectomy) -- cr up to 1.8  taken off losartan/ HCTZ with improvement -- has labs from 4/23 creat  0.8 \par no complaints -- has lost 35 lbs post bariatric surgery \par int headaches - hasn’t checked BP - was nl at derm visit \par chronic left flank pain (preceded surgery) \par \par

## 2021-08-18 ENCOUNTER — APPOINTMENT (OUTPATIENT)
Dept: BARIATRICS | Facility: CLINIC | Age: 56
End: 2021-08-18
Payer: COMMERCIAL

## 2021-08-18 VITALS
BODY MASS INDEX: 32.65 KG/M2 | HEART RATE: 93 BPM | HEIGHT: 65 IN | SYSTOLIC BLOOD PRESSURE: 103 MMHG | OXYGEN SATURATION: 100 % | TEMPERATURE: 95.1 F | DIASTOLIC BLOOD PRESSURE: 59 MMHG | WEIGHT: 196 LBS

## 2021-08-18 PROCEDURE — 99212 OFFICE O/P EST SF 10 MIN: CPT

## 2021-09-19 NOTE — ASSESSMENT
[FreeTextEntry1] : She was told to start a regular exercise regimen focused on strength training. Routine postop blood work was ordered.

## 2021-09-19 NOTE — HISTORY OF PRESENT ILLNESS
[de-identified] : Patient is doing well and has no complaints. She is eating a proper diet and taking her vitamins. She is not exercising much.

## 2021-12-02 ENCOUNTER — APPOINTMENT (OUTPATIENT)
Dept: BARIATRICS | Facility: CLINIC | Age: 56
End: 2021-12-02
Payer: COMMERCIAL

## 2021-12-02 VITALS — BODY MASS INDEX: 28.79 KG/M2 | WEIGHT: 173 LBS

## 2021-12-02 LAB
25(OH)D3 SERPL-MCNC: 43.9 NG/ML
A-TOCOPHEROL VIT E SERPL-MCNC: 14.7 MG/L
ALBUMIN SERPL ELPH-MCNC: 4.2 G/DL
ALP BLD-CCNC: 81 U/L
ALT SERPL-CCNC: 12 U/L
ANION GAP SERPL CALC-SCNC: 14 MMOL/L
AST SERPL-CCNC: 14 U/L
BASOPHILS # BLD AUTO: 0.03 K/UL
BASOPHILS NFR BLD AUTO: 0.5 %
BETA+GAMMA TOCOPHEROL SERPL-MCNC: 1.1 MG/L
BILIRUB SERPL-MCNC: 0.4 MG/DL
BUN SERPL-MCNC: 17 MG/DL
CALCIUM SERPL-MCNC: 9.3 MG/DL
CALCIUM SERPL-MCNC: 9.3 MG/DL
CHLORIDE SERPL-SCNC: 105 MMOL/L
CHOLEST SERPL-MCNC: 229 MG/DL
CO2 SERPL-SCNC: 24 MMOL/L
CREAT SERPL-MCNC: 0.96 MG/DL
EOSINOPHIL # BLD AUTO: 0.09 K/UL
EOSINOPHIL NFR BLD AUTO: 1.5 %
ESTIMATED AVERAGE GLUCOSE: 105 MG/DL
FOLATE SERPL-MCNC: 9 NG/ML
GLUCOSE SERPL-MCNC: 95 MG/DL
HBA1C MFR BLD HPLC: 5.3 %
HCT VFR BLD CALC: 46.4 %
HDLC SERPL-MCNC: 44 MG/DL
HGB BLD-MCNC: 14.9 G/DL
IMM GRANULOCYTES NFR BLD AUTO: 0.2 %
IRON SERPL-MCNC: 66 UG/DL
LDLC SERPL CALC-MCNC: 164 MG/DL
LYMPHOCYTES # BLD AUTO: 1.77 K/UL
LYMPHOCYTES NFR BLD AUTO: 29.6 %
MAN DIFF?: NORMAL
MCHC RBC-ENTMCNC: 31.3 PG
MCHC RBC-ENTMCNC: 32.1 GM/DL
MCV RBC AUTO: 97.5 FL
MONOCYTES # BLD AUTO: 0.39 K/UL
MONOCYTES NFR BLD AUTO: 6.5 %
NEUTROPHILS # BLD AUTO: 3.68 K/UL
NEUTROPHILS NFR BLD AUTO: 61.7 %
NONHDLC SERPL-MCNC: 185 MG/DL
PARATHYROID HORMONE INTACT: 41 PG/ML
PLATELET # BLD AUTO: 208 K/UL
POTASSIUM SERPL-SCNC: 4 MMOL/L
PREALB SERPL NEPH-MCNC: 15 MG/DL
PROT SERPL-MCNC: 6.7 G/DL
RBC # BLD: 4.76 M/UL
RBC # FLD: 12.9 %
SODIUM SERPL-SCNC: 143 MMOL/L
TRIGL SERPL-MCNC: 103 MG/DL
TSH SERPL-ACNC: 1.15 UIU/ML
VIT A SERPL-MCNC: 29.3 UG/DL
VIT B1 SERPL-MCNC: 112.1 NMOL/L
VIT B12 SERPL-MCNC: 640 PG/ML
WBC # FLD AUTO: 5.97 K/UL
ZINC SERPL-MCNC: 79 UG/DL

## 2021-12-02 PROCEDURE — 99212 OFFICE O/P EST SF 10 MIN: CPT | Mod: 95

## 2021-12-30 ENCOUNTER — NON-APPOINTMENT (OUTPATIENT)
Age: 56
End: 2021-12-30

## 2022-01-03 NOTE — HISTORY OF PRESENT ILLNESS
[Home] : at home, [unfilled] , at the time of the visit. [Other Location: e.g. Home (Enter Location, City,State)___] : at [unfilled] [Verbal consent obtained from patient] : the patient, [unfilled] [de-identified] : Patient is doing well and has no complaints s/p sleeve gastrectomy 3/8/21. She is eating a proper diet. taking her vitamins and exercising regularly.

## 2022-01-03 NOTE — ASSESSMENT
[FreeTextEntry1] : She was told to do more strength training and to continue with her current diet.

## 2022-01-05 ENCOUNTER — APPOINTMENT (OUTPATIENT)
Dept: BREAST CENTER | Facility: CLINIC | Age: 57
End: 2022-01-05
Payer: COMMERCIAL

## 2022-01-05 VITALS — WEIGHT: 173 LBS | BODY MASS INDEX: 28.82 KG/M2 | HEART RATE: 83 BPM | HEIGHT: 65 IN | OXYGEN SATURATION: 96 %

## 2022-01-05 DIAGNOSIS — Z80.1 FAMILY HISTORY OF MALIGNANT NEOPLASM OF TRACHEA, BRONCHUS AND LUNG: ICD-10-CM

## 2022-01-05 DIAGNOSIS — Z78.9 OTHER SPECIFIED HEALTH STATUS: ICD-10-CM

## 2022-01-05 DIAGNOSIS — Z80.3 FAMILY HISTORY OF MALIGNANT NEOPLASM OF BREAST: ICD-10-CM

## 2022-01-05 DIAGNOSIS — F17.210 NICOTINE DEPENDENCE, CIGARETTES, UNCOMPLICATED: ICD-10-CM

## 2022-01-05 DIAGNOSIS — N64.9 DISORDER OF BREAST, UNSPECIFIED: ICD-10-CM

## 2022-01-05 PROCEDURE — 99204 OFFICE O/P NEW MOD 45 MIN: CPT

## 2022-01-05 RX ORDER — ZOLPIDEM TARTRATE 5 MG/1
TABLET, FILM COATED ORAL
Refills: 0 | Status: ACTIVE | COMMUNITY

## 2022-01-05 RX ORDER — ESCITALOPRAM OXALATE 20 MG/1
20 TABLET, FILM COATED ORAL
Refills: 0 | Status: ACTIVE | COMMUNITY

## 2022-01-05 RX ORDER — MULTIVITAMIN
TABLET ORAL
Refills: 0 | Status: ACTIVE | COMMUNITY

## 2022-01-09 NOTE — HISTORY OF PRESENT ILLNESS
[FreeTextEntry1] : 56 year old female was referred by Dr. Zane Saab (OB/GYN) who presents for initial evaluation regarding a painful palpable lesion of the left breast skin outer quadrant noted 2 months ago. describes it as a "pimple", reports purulent drainage following irritation from her bra. She followed up with the dermatologist, who performed one cortisone injection into the lesion 2 weeks ago with notable improvement. Denies palpable abnormalities of the breast tissue, no skin changes/dimpling, no nipple discharge bilaterally. \par \par Of note, the patient is s/p sleeve gastrectomy in 3/8/2021, she states since her surgery, she reports a decrease in sensation of her breasts, has lost approximately 100lbs thus far. \par \par Patient has a family history of breast cancer, mother was diagnosed at the age of 35. Patient reports she underwent genetic testing prior to 2014 with reportedly negative results. \par \par LINNETTE lifetime risk of 13.2%. \par \par

## 2022-01-09 NOTE — PHYSICAL EXAM
[Normocephalic] : normocephalic [Examined in the supine and seated position] : examined in the supine and seated position [Asymmetrical] : asymmetrical [No dominant masses] : no dominant masses in right breast  [No dominant masses] : no dominant masses left breast [No Nipple Retraction] : no left nipple retraction [No Nipple Discharge] : no left nipple discharge [de-identified] : 2cm hyperpigmented lesion consistent with healing sebaceous cyst outer quadrant

## 2022-01-09 NOTE — CONSULT LETTER
[Dear  ___] : Dear ~KAM, [Consult Letter:] : I had the pleasure of evaluating your patient, [unfilled]. [Please see my note below.] : Please see my note below. [Consult Closing:] : Thank you very much for allowing me to participate in the care of this patient.  If you have any questions, please do not hesitate to contact me. [Sincerely,] : Sincerely, [FreeTextEntry2] : Dr. Zane Saab [FreeTextEntry3] : Kwabena\par \par Kwabena Schwartz MD\par Chief of Breast Surgery\par Director of Breast Cancer Program\par [facility]\par \par \par

## 2022-01-09 NOTE — ASSESSMENT
[FreeTextEntry1] : Left breast sebaceous cyst outer quadrant, reassured patient regarding benign presentation of a resolving skin lesion. Given her family history of breast cancer, discussed she should follow up in our office in August 2022 and proceed with her screening mammogram at that time. \par \par She is interested in breast reduction and lift, referral to plastic surgeon was offered. \par \par

## 2022-01-09 NOTE — PAST MEDICAL HISTORY
[Postmenopausal] : The patient is postmenopausal [Menarche Age ____] : age at menarche was [unfilled] [Menopause Age____] : age at menopause was [unfilled] [Definite ___ (Date)] : the last menstrual period was [unfilled] [Total Preg ___] : G[unfilled] [Live Births ___] : P[unfilled]  [History of Hormone Replacement Treatment] : has no history of hormone replacement treatment [FreeTextEntry5] : 2005; Hysterectomy [FreeTextEntry6] : No [FreeTextEntry7] : No [FreeTextEntry8] : Yes

## 2022-01-09 NOTE — DATA REVIEWED
[FreeTextEntry1] : 8/24/2021 ([facility]) bilateral mammogram/US showing scattered fibroglandular density, no new dominant breast mass. Benign BIRADS 2 \par  \par

## 2022-01-25 ENCOUNTER — NON-APPOINTMENT (OUTPATIENT)
Age: 57
End: 2022-01-25

## 2022-02-22 ENCOUNTER — APPOINTMENT (OUTPATIENT)
Dept: PLASTIC SURGERY | Facility: CLINIC | Age: 57
End: 2022-02-22
Payer: COMMERCIAL

## 2022-02-22 VITALS
BODY MASS INDEX: 28.82 KG/M2 | OXYGEN SATURATION: 98 % | HEART RATE: 80 BPM | SYSTOLIC BLOOD PRESSURE: 131 MMHG | RESPIRATION RATE: 18 BRPM | HEIGHT: 65 IN | TEMPERATURE: 98.2 F | WEIGHT: 173 LBS | DIASTOLIC BLOOD PRESSURE: 84 MMHG

## 2022-02-22 PROCEDURE — 99202 OFFICE O/P NEW SF 15 MIN: CPT

## 2022-02-22 NOTE — HISTORY OF PRESENT ILLNESS
[FreeTextEntry1] : Patient is a 56 year old Female refereed by Dr. Schwartz for B/L breast reduction and lift. She saw Dr. Schwartz for painful palpable lesion left breast noted 2 months ago on self exam. Left breast sebaceous cyst outer quadrant benign and she would like to remove. Patient with family history of breast cancer, mother diagnosed age 35. She is s/p sleeve gastrectomy 3/8/2021. Has attempted weight loss with sleeve gastrectomy, but now has significant ptosis of her B/L breasts with decreased volume. She reports decreased sensation of her breasts B/L. Her weight has been stable for the last year. Also wanted to discuss Abdominoplasty for reducing loose excess skin. \par \par Denies history of DVTs, blood clots, or bleeding disorders. Having routine mammogram August 2022.\par \par PE:\par Bra Cup Size: 36 DD, B/L pendulous ptotic breasts, thin skin without  no nipple inversion, no nipple discharge, Left breast cyst \par Ptosis: Grade III , BMI: 28.79 kg/m2\par Abdomen: Loose excess skin laxity central abdomen and circumferentially, large overhanging pannus, no obvious scars, no obvious or palpable masses or hernias noted. \par \par A/P:\par - B/L Breast reduction to B cup (secondary to volume) with removal of Left Breast Cyst \par - Discussed Abdominoplasty vs. 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 Nature of the surgery, risks, benefits, alternatives, expected postoperative course, recovery and long term results were reviewed. All questions answered.\par Will coordinate surgery for the near future.\par \par  \par \par \par

## 2022-06-01 ENCOUNTER — APPOINTMENT (OUTPATIENT)
Dept: BARIATRICS | Facility: CLINIC | Age: 57
End: 2022-06-01

## 2022-06-01 VITALS
HEIGHT: 65 IN | WEIGHT: 144.38 LBS | HEART RATE: 67 BPM | SYSTOLIC BLOOD PRESSURE: 138 MMHG | OXYGEN SATURATION: 99 % | TEMPERATURE: 97.9 F | DIASTOLIC BLOOD PRESSURE: 81 MMHG | BODY MASS INDEX: 24.06 KG/M2

## 2022-06-01 DIAGNOSIS — K91.2 POSTSURGICAL MALABSORPTION, NOT ELSEWHERE CLASSIFIED: ICD-10-CM

## 2022-06-01 DIAGNOSIS — Z98.84 BARIATRIC SURGERY STATUS: ICD-10-CM

## 2022-06-01 PROCEDURE — 99212 OFFICE O/P EST SF 10 MIN: CPT

## 2022-06-02 LAB
25(OH)D3 SERPL-MCNC: 62.6 NG/ML
ALBUMIN SERPL ELPH-MCNC: 4.5 G/DL
ALP BLD-CCNC: 82 U/L
ALT SERPL-CCNC: 22 U/L
ANION GAP SERPL CALC-SCNC: 12 MMOL/L
AST SERPL-CCNC: 22 U/L
BASOPHILS # BLD AUTO: 0.06 K/UL
BASOPHILS NFR BLD AUTO: 0.9 %
BILIRUB SERPL-MCNC: 0.3 MG/DL
BUN SERPL-MCNC: 25 MG/DL
CALCIUM SERPL-MCNC: 9.5 MG/DL
CALCIUM SERPL-MCNC: 9.5 MG/DL
CHLORIDE SERPL-SCNC: 104 MMOL/L
CHOLEST SERPL-MCNC: 213 MG/DL
CO2 SERPL-SCNC: 26 MMOL/L
CREAT SERPL-MCNC: 0.91 MG/DL
EGFR: 74 ML/MIN/1.73M2
EOSINOPHIL # BLD AUTO: 0.11 K/UL
EOSINOPHIL NFR BLD AUTO: 1.7 %
FOLATE SERPL-MCNC: 16.4 NG/ML
GLUCOSE SERPL-MCNC: 84 MG/DL
HCT VFR BLD CALC: 47.1 %
HDLC SERPL-MCNC: 73 MG/DL
HGB BLD-MCNC: 15.3 G/DL
IMM GRANULOCYTES NFR BLD AUTO: 0.3 %
IRON SERPL-MCNC: 92 UG/DL
LDLC SERPL CALC-MCNC: 121 MG/DL
LYMPHOCYTES # BLD AUTO: 2.63 K/UL
LYMPHOCYTES NFR BLD AUTO: 40.4 %
MAN DIFF?: NORMAL
MCHC RBC-ENTMCNC: 31.2 PG
MCHC RBC-ENTMCNC: 32.5 GM/DL
MCV RBC AUTO: 95.9 FL
MONOCYTES # BLD AUTO: 0.39 K/UL
MONOCYTES NFR BLD AUTO: 6 %
NEUTROPHILS # BLD AUTO: 3.3 K/UL
NEUTROPHILS NFR BLD AUTO: 50.7 %
NONHDLC SERPL-MCNC: 140 MG/DL
PARATHYROID HORMONE INTACT: 48 PG/ML
PLATELET # BLD AUTO: 226 K/UL
POTASSIUM SERPL-SCNC: 4.4 MMOL/L
PREALB SERPL NEPH-MCNC: 18 MG/DL
PROT SERPL-MCNC: 7.1 G/DL
RBC # BLD: 4.91 M/UL
RBC # FLD: 12.6 %
SODIUM SERPL-SCNC: 142 MMOL/L
TRIGL SERPL-MCNC: 91 MG/DL
VIT B12 SERPL-MCNC: 649 PG/ML
WBC # FLD AUTO: 6.51 K/UL

## 2022-06-04 LAB — ZINC SERPL-MCNC: 78 UG/DL

## 2022-06-07 LAB — VIT B1 SERPL-MCNC: 135.6 NMOL/L

## 2022-06-08 LAB
A-TOCOPHEROL VIT E SERPL-MCNC: 16.5 MG/L
BETA+GAMMA TOCOPHEROL SERPL-MCNC: 0.7 MG/L
VIT A SERPL-MCNC: 42.3 UG/DL

## 2022-06-17 ENCOUNTER — NON-APPOINTMENT (OUTPATIENT)
Age: 57
End: 2022-06-17

## 2022-06-30 PROBLEM — Z98.84 S/P LAPAROSCOPIC SLEEVE GASTRECTOMY: Status: ACTIVE | Noted: 2021-04-01

## 2022-06-30 NOTE — HISTORY OF PRESENT ILLNESS
[de-identified] : Patient is doing well and has no complaints a little over one year s/p VSG. She is eating a proper diet, taking her vitamins and exercising .

## 2022-08-11 ENCOUNTER — NON-APPOINTMENT (OUTPATIENT)
Age: 57
End: 2022-08-11

## 2022-08-22 NOTE — DATA REVIEWED
[FreeTextEntry1] : 8/24/2021 ([facility]) bilateral mammogram/US showing scattered fibroglandular density, no new dominant breast mass. Benign BIRADS 2 \par  \par B/L MMG ([facility]): PENDING \par

## 2022-08-22 NOTE — HISTORY OF PRESENT ILLNESS
[FreeTextEntry1] : 56 year old female presents for 6 month follow up regarding a sebaceous cyst of the left outer quadrant noted in November 2022 s/p cortisone injection by dermatologist, which patient reports provided improvement. \par \par Denies palpable abnormalities of the breast tissue, no skin changes/dimpling, no nipple discharge bilaterally. \par \par B/L MMG, BIRADS-?? \par \par Of note, the patient is s/p sleeve gastrectomy in 3/8/2021, she states since her surgery, she reports a decrease in sensation of her breasts, has lost approximately 100lbs thus far. Patient was referred to plastic surgeon Dr. Sharpe with plan for \par \par Patient has a family history of breast cancer, mother was diagnosed at the age of 35. Patient reports she underwent genetic testing prior to 2014 with reportedly negative results. \par \par LINNETTE lifetime risk of 13.2%. \par \par \par \par She is interested in breast reduction and lift, referral to plastic surgeon was offered. \par \par \par bilateral mammogram August 2022\par F/U August 2022 \par \par

## 2022-08-22 NOTE — PHYSICAL EXAM
[Normocephalic] : normocephalic [Asymmetrical] : asymmetrical [Examined in the supine and seated position] : examined in the supine and seated position [No dominant masses] : no dominant masses in right breast  [No dominant masses] : no dominant masses left breast [No Nipple Retraction] : no left nipple retraction [No Nipple Discharge] : no left nipple discharge [de-identified] : 2cm hyperpigmented lesion consistent with healing sebaceous cyst outer quadrant

## 2022-08-24 ENCOUNTER — APPOINTMENT (OUTPATIENT)
Dept: BREAST CENTER | Facility: CLINIC | Age: 57
End: 2022-08-24

## 2022-12-08 ENCOUNTER — APPOINTMENT (OUTPATIENT)
Dept: RADIOLOGY | Facility: HOSPITAL | Age: 57
End: 2022-12-08

## 2022-12-08 ENCOUNTER — OUTPATIENT (OUTPATIENT)
Dept: OUTPATIENT SERVICES | Facility: HOSPITAL | Age: 57
LOS: 1 days | End: 2022-12-08
Payer: COMMERCIAL

## 2022-12-08 DIAGNOSIS — Z41.9 ENCOUNTER FOR PROCEDURE FOR PURPOSES OTHER THAN REMEDYING HEALTH STATE, UNSPECIFIED: Chronic | ICD-10-CM

## 2022-12-08 DIAGNOSIS — Z90.711 ACQUIRED ABSENCE OF UTERUS WITH REMAINING CERVICAL STUMP: Chronic | ICD-10-CM

## 2022-12-08 DIAGNOSIS — Z90.49 ACQUIRED ABSENCE OF OTHER SPECIFIED PARTS OF DIGESTIVE TRACT: Chronic | ICD-10-CM

## 2022-12-08 PROCEDURE — 74220 X-RAY XM ESOPHAGUS 1CNTRST: CPT

## 2022-12-08 PROCEDURE — 74220 X-RAY XM ESOPHAGUS 1CNTRST: CPT | Mod: 26

## 2025-04-15 NOTE — ED PROVIDER NOTE - MUSCULOSKELETAL, MLM
Patient returned call to PAT. Contact number given. Patient will call PAT.  
Patient returning call / Number provided  Pat Access / Patient to return call  
Spine appears normal, range of motion is not limited,  no midline spine tenderness. + tenderness to left upper back.